# Patient Record
Sex: FEMALE | Race: WHITE | NOT HISPANIC OR LATINO | Employment: OTHER | URBAN - METROPOLITAN AREA
[De-identification: names, ages, dates, MRNs, and addresses within clinical notes are randomized per-mention and may not be internally consistent; named-entity substitution may affect disease eponyms.]

---

## 2018-05-22 ENCOUNTER — TRANSCRIBE ORDERS (OUTPATIENT)
Dept: ADMINISTRATIVE | Facility: HOSPITAL | Age: 48
End: 2018-05-22

## 2018-05-22 DIAGNOSIS — Z12.39 SCREENING BREAST EXAMINATION: Primary | ICD-10-CM

## 2018-05-25 ENCOUNTER — HOSPITAL ENCOUNTER (OUTPATIENT)
Dept: MAMMOGRAPHY | Facility: HOSPITAL | Age: 48
Discharge: HOME | End: 2018-05-25
Attending: INTERNAL MEDICINE | Admitting: INTERNAL MEDICINE

## 2018-05-25 DIAGNOSIS — Z12.39 SCREENING BREAST EXAMINATION: ICD-10-CM

## 2018-05-25 PROCEDURE — 77067 SCR MAMMO BI INCL CAD: CPT

## 2018-06-24 ENCOUNTER — HOSPITAL ENCOUNTER (EMERGENCY)
Facility: HOSPITAL | Age: 48
Discharge: HOME | End: 2018-06-24
Attending: EMERGENCY MEDICINE | Admitting: EMERGENCY MEDICINE

## 2018-06-24 VITALS
RESPIRATION RATE: 16 BRPM | SYSTOLIC BLOOD PRESSURE: 109 MMHG | HEART RATE: 72 BPM | WEIGHT: 145 LBS | OXYGEN SATURATION: 97 % | HEIGHT: 60 IN | TEMPERATURE: 98.2 F | BODY MASS INDEX: 28.47 KG/M2 | DIASTOLIC BLOOD PRESSURE: 82 MMHG

## 2018-06-24 DIAGNOSIS — S91.301A OPEN WOUND OF RIGHT FOOT, INITIAL ENCOUNTER: Primary | ICD-10-CM

## 2018-06-24 PROCEDURE — 90471 IMMUNIZATION ADMIN: CPT | Performed by: EMERGENCY MEDICINE

## 2018-06-24 PROCEDURE — 99283 EMERGENCY DEPT VISIT LOW MDM: CPT

## 2018-06-24 PROCEDURE — 90715 TDAP VACCINE 7 YRS/> IM: CPT | Performed by: EMERGENCY MEDICINE

## 2018-06-24 PROCEDURE — 99282 EMERGENCY DEPT VISIT SF MDM: CPT | Performed by: EMERGENCY MEDICINE

## 2018-06-24 PROCEDURE — 25010000002 TDAP 5-2.5-18.5 LF-MCG/0.5 SUSPENSION: Performed by: EMERGENCY MEDICINE

## 2018-06-24 RX ORDER — CEPHALEXIN 500 MG/1
500 CAPSULE ORAL EVERY 6 HOURS
COMMUNITY
End: 2019-07-30

## 2018-06-24 RX ORDER — LEVOTHYROXINE SODIUM 0.07 MG/1
75 TABLET ORAL DAILY
COMMUNITY

## 2018-06-24 RX ADMIN — TETANUS TOXOID, REDUCED DIPHTHERIA TOXOID AND ACELLULAR PERTUSSIS VACCINE, ADSORBED 0.5 ML: 5; 2.5; 8; 8; 2.5 SUSPENSION INTRAMUSCULAR at 11:28

## 2018-09-12 ENCOUNTER — TRANSCRIBE ORDERS (OUTPATIENT)
Dept: ADMINISTRATIVE | Facility: HOSPITAL | Age: 48
End: 2018-09-12

## 2018-09-12 DIAGNOSIS — R00.2 PALPITATIONS: Primary | ICD-10-CM

## 2018-09-13 ENCOUNTER — HOSPITAL ENCOUNTER (OUTPATIENT)
Dept: CARDIOLOGY | Facility: HOSPITAL | Age: 48
Discharge: HOME | End: 2018-09-13
Attending: INTERNAL MEDICINE | Admitting: INTERNAL MEDICINE

## 2018-09-13 DIAGNOSIS — R00.2 PALPITATIONS: ICD-10-CM

## 2018-09-13 PROCEDURE — 93225 XTRNL ECG REC<48 HRS REC: CPT

## 2018-09-13 PROCEDURE — 93226 XTRNL ECG REC<48 HR SCAN A/R: CPT

## 2018-09-17 PROCEDURE — 93227 XTRNL ECG REC<48 HR R&I: CPT | Performed by: INTERNAL MEDICINE

## 2019-05-17 RX ORDER — METRONIDAZOLE 500 MG/1
500 TABLET ORAL 3 TIMES DAILY
Qty: 21 TABLET | Refills: 0 | Status: SHIPPED | OUTPATIENT
Start: 2019-05-17 | End: 2019-05-24

## 2019-05-20 ENCOUNTER — OFFICE VISIT (OUTPATIENT)
Dept: FAMILY MEDICINE CLINIC | Facility: CLINIC | Age: 49
End: 2019-05-20

## 2019-05-20 VITALS
TEMPERATURE: 98.4 F | OXYGEN SATURATION: 98 % | HEART RATE: 77 BPM | SYSTOLIC BLOOD PRESSURE: 116 MMHG | BODY MASS INDEX: 24.32 KG/M2 | WEIGHT: 146 LBS | DIASTOLIC BLOOD PRESSURE: 64 MMHG | HEIGHT: 65 IN | RESPIRATION RATE: 14 BRPM

## 2019-05-20 DIAGNOSIS — E03.9 HYPOTHYROIDISM, UNSPECIFIED TYPE: ICD-10-CM

## 2019-05-20 DIAGNOSIS — E78.5 HYPERLIPIDEMIA, UNSPECIFIED HYPERLIPIDEMIA TYPE: Primary | ICD-10-CM

## 2019-05-20 PROBLEM — C43.9 MELANOMA (HCC): Status: ACTIVE | Noted: 2019-05-20

## 2019-05-20 LAB
ALBUMIN SERPL-MCNC: 4.6 G/DL (ref 3.5–5.2)
ALBUMIN/GLOB SERPL: 1.8 G/DL
ALP SERPL-CCNC: 70 U/L (ref 39–117)
ALT SERPL-CCNC: 16 U/L (ref 1–33)
AST SERPL-CCNC: 20 U/L (ref 1–32)
BASOPHILS # BLD AUTO: 0.05 10*3/MM3 (ref 0–0.2)
BASOPHILS NFR BLD AUTO: 0.8 % (ref 0–1.5)
BILIRUB SERPL-MCNC: 0.4 MG/DL (ref 0.2–1.2)
BUN SERPL-MCNC: 14 MG/DL (ref 6–20)
BUN/CREAT SERPL: 14 (ref 7–25)
CALCIUM SERPL-MCNC: 10.2 MG/DL (ref 8.6–10.5)
CHLORIDE SERPL-SCNC: 105 MMOL/L (ref 98–107)
CHOLEST SERPL-MCNC: 206 MG/DL (ref 0–200)
CHOLEST/HDLC SERPL: 3.22 {RATIO}
CO2 SERPL-SCNC: 26.4 MMOL/L (ref 22–29)
CREAT SERPL-MCNC: 1 MG/DL (ref 0.57–1)
EOSINOPHIL # BLD AUTO: 0.09 10*3/MM3 (ref 0–0.4)
EOSINOPHIL NFR BLD AUTO: 1.4 % (ref 0.3–6.2)
ERYTHROCYTE [DISTWIDTH] IN BLOOD BY AUTOMATED COUNT: 13.2 % (ref 12.3–15.4)
GLOBULIN SER CALC-MCNC: 2.6 GM/DL
GLUCOSE SERPL-MCNC: 91 MG/DL (ref 65–99)
HCT VFR BLD AUTO: 48.2 % (ref 34–46.6)
HDLC SERPL-MCNC: 64 MG/DL (ref 40–60)
HGB BLD-MCNC: 15.8 G/DL (ref 12–15.9)
IMM GRANULOCYTES # BLD AUTO: 0.01 10*3/MM3 (ref 0–0.05)
IMM GRANULOCYTES NFR BLD AUTO: 0.2 % (ref 0–0.5)
LDLC SERPL CALC-MCNC: 121 MG/DL (ref 0–100)
LYMPHOCYTES # BLD AUTO: 2.14 10*3/MM3 (ref 0.7–3.1)
LYMPHOCYTES NFR BLD AUTO: 34.2 % (ref 19.6–45.3)
MCH RBC QN AUTO: 30.9 PG (ref 26.6–33)
MCHC RBC AUTO-ENTMCNC: 32.8 G/DL (ref 31.5–35.7)
MCV RBC AUTO: 94.1 FL (ref 79–97)
MONOCYTES # BLD AUTO: 0.4 10*3/MM3 (ref 0.1–0.9)
MONOCYTES NFR BLD AUTO: 6.4 % (ref 5–12)
NEUTROPHILS # BLD AUTO: 3.57 10*3/MM3 (ref 1.7–7)
NEUTROPHILS NFR BLD AUTO: 57 % (ref 42.7–76)
NRBC BLD AUTO-RTO: 0 /100 WBC (ref 0–0.2)
PLATELET # BLD AUTO: 203 10*3/MM3 (ref 140–450)
POTASSIUM SERPL-SCNC: 4.9 MMOL/L (ref 3.5–5.2)
PROT SERPL-MCNC: 7.2 G/DL (ref 6–8.5)
RBC # BLD AUTO: 5.12 10*6/MM3 (ref 3.77–5.28)
SODIUM SERPL-SCNC: 143 MMOL/L (ref 136–145)
T4 FREE SERPL-MCNC: 1.35 NG/DL (ref 0.93–1.7)
TRIGL SERPL-MCNC: 103 MG/DL (ref 0–150)
TSH SERPL DL<=0.005 MIU/L-ACNC: 2.34 MIU/ML (ref 0.27–4.2)
VLDLC SERPL CALC-MCNC: 20.6 MG/DL
WBC # BLD AUTO: 6.26 10*3/MM3 (ref 3.4–10.8)

## 2019-05-20 PROCEDURE — 99213 OFFICE O/P EST LOW 20 MIN: CPT | Performed by: INTERNAL MEDICINE

## 2019-05-20 NOTE — PROGRESS NOTES
ALYCE@  Angi HAM Walker is a 49 y.o. female.     No chief complaint on file.  Patient is here for follow-up on hyperlipidemia.    History of Present Illness   Is here for follow-up for hyperlipidemia.  Wants thyroid testing done to she follows with the endocrinologist.  Last week she called for abdominal pain suspicious for diverticulitis flaring up however did not take any medication as she got better with stool softeners.  Patient aware she needs to keep her stool soft.  No fever or chills.  The following portions of the patient's history were reviewed and updated as appropriate: allergies, current medications, past family history, past medical history, past social history, past surgical history and problem list.    Review of Systems   Constitutional: Negative for activity change, appetite change, fatigue and fever.   Eyes: Negative for blurred vision and double vision.   Respiratory: Negative for shortness of breath.    Cardiovascular: Negative for chest pain, palpitations and leg swelling.   Gastrointestinal: Negative.    Neurological: Negative for dizziness, syncope, light-headedness and headache.       Allergies   Allergen Reactions   • Levofloxacin Nausea Only       Current Outpatient Medications on File Prior to Visit   Medication Sig Dispense Refill   • levothyroxine (SYNTHROID, LEVOTHROID) 75 MCG tablet Take 75 mcg by mouth Daily.     • cephalexin (KEFLEX) 500 MG capsule Take 500 mg by mouth Every 6 (Six) Hours.     • metroNIDAZOLE (FLAGYL) 500 MG tablet Take 1 tablet by mouth 3 (Three) Times a Day for 7 days. 21 tablet 0   • mupirocin (BACTROBAN) 2 % ointment Apply  topically 3 (Three) Times a Day.       No current facility-administered medications on file prior to visit.        Family History   Problem Relation Age of Onset   • Breast cancer Maternal Aunt        Past Medical History:   Diagnosis Date   • Diverticulosis    • Hypothyroidism    • Melanoma (CMS/HCC)        Past Surgical History:    Procedure Laterality Date   • HYSTERECTOMY         Social History     Socioeconomic History   • Marital status:      Spouse name: Not on file   • Number of children: Not on file   • Years of education: Not on file   • Highest education level: Not on file   Tobacco Use   • Smoking status: Current Every Day Smoker     Packs/day: 0.50     Types: Cigarettes   • Smokeless tobacco: Never Used   Substance and Sexual Activity   • Alcohol use: Yes     Comment: rare use   • Drug use: No   • Sexual activity: Yes     Partners: Male       Patient Active Problem List   Diagnosis   • Melanoma (CMS/HCC)   • Hypothyroidism   • Hyperlipidemia       Vitals:    05/20/19 0845   BP: 116/64   Pulse: 77   Resp: 14   Temp: 98.4 °F (36.9 °C)   SpO2: 98%       Objective   Physical Exam   Constitutional: She is oriented to person, place, and time. She appears well-developed.   Eyes: EOM are normal. Pupils are equal, round, and reactive to light.   Neck: Normal range of motion. Neck supple. No JVD present. No tracheal deviation present. No thyromegaly present.   Cardiovascular: Normal rate, regular rhythm and intact distal pulses.   No murmur heard.  Pulmonary/Chest: Effort normal and breath sounds normal. No respiratory distress. She has no wheezes.   Abdominal: Soft. Bowel sounds are normal.   Musculoskeletal: She exhibits no edema.   Lymphadenopathy:     She has no cervical adenopathy.   Neurological: She is alert and oriented to person, place, and time.         Assessment/Plan   Diagnoses and all orders for this visit:    Hyperlipidemia, unspecified hyperlipidemia type  -     Ambulatory Referral to Gastroenterology  -     CBC & Differential  -     Comprehensive Metabolic Panel  -     Lipid Panel With / Chol / HDL Ratio  -     TSH  -     T4, Free    Hypothyroidism, unspecified type  -     Ambulatory Referral to Gastroenterology  -     CBC & Differential  -     Comprehensive Metabolic Panel  -     Lipid Panel With / Chol / HDL  Ratio  -     TSH  -     T4, Free    Continue diet and exercise.  Continue to follow with the endocrinologist.  Continue taking Synthroid.  Abdominal pain is resolved.  Patient aware flareup of diverticulitis can cause perforation and abscess.  Return in 6 months.

## 2019-06-12 ENCOUNTER — TELEPHONE (OUTPATIENT)
Dept: GASTROENTEROLOGY | Facility: CLINIC | Age: 49
End: 2019-06-12

## 2019-06-12 DIAGNOSIS — Z12.11 COLON CANCER SCREENING: Primary | ICD-10-CM

## 2019-06-14 PROBLEM — Z12.11 COLON CANCER SCREENING: Status: ACTIVE | Noted: 2019-06-14

## 2019-06-14 NOTE — TELEPHONE ENCOUNTER
emailed instructions    Dr. Constance Rehman   Date: ___7/31/19________     Arrival Time: __8:00am_______    Hospital:  [facility] [place] ([address] [place] [address] [place] [address]) (back of hospital at the emergency room)        Please call the office as soon as possible if you need to reschedule or cancel your procedure please give two weeks’ notice.  If you do not show up or frequently reschedule your procedure, your provider has the option of not rescheduling.    Stop the following medications 5 days prior to your procedure- check with the prescribing doctor prior to holding.  No Aspirin, Advil, Aleve, Motrin, IBU or anything listed on the back of this form.    If you are taking any medications on the attached list and/or pills that contain iron please stop them one week prior. Insulin will be addressed separately.    1.  your prescription AND a 10 oz. bottle of Magnesium Citrate (over the counter) as soon as possible. Do not wait until the day before in case of issues with cost or etc.    The day before your procedure  It is very important that you follow the instructions on this form and not on the prep you were prescribed.    You will be on a clear liquid diet only which may include coffee , tea, soft drinks, broth(chicken beef or vegetable), popsicles, Jell-O, Gatorade, juice (no orange, grapefruit or V-8).  ®No red or purple dyes and no dairy or non-dairy.    2. At 8:00 am drink the bottle of magnesium citrate.  Drink plenty of fluids in between    3. At    2:00 pm drink first dose or ½ of prep, mix as directed on container/box    Drink plenty of fluids between    4. At   10:00 pm      drink second dose or ½ of prep, mix as directed on container/box    5. If you start to get nauseated while drinking your prep try stepping away for 15-20 min. then resume again at a slower pace.    You may have clear liquids only up to 4 hours before your procedure.  No gum or candy!     No smoking or tobacco  products!    You may only take your morning prescription blood pressure (no diuretic combinations), breathing, seizure, psych or heart medications.     You will need a  to accompany you for your exam. Bus or uber not allowed The average time spent in our facility is around 2-3 hours.  You are not to drive, operate machinery or make legal decisions the remainder of the day following you procedure.    Please call [email or web address] [phone] if you have questions about any of this information.  If it is after hours or the weekend and you need to reach the doctor or have questions for the office please call [phone].     It is your responsibility to check with your insurance company to determine benefits and out of pocket costs.      Avoid these medications 5-7 days prior to surgery  Please check with your prescribing doctor before stopping any medications  NSAIDS- Advil, Aleve, Motrin, Ibuprofen, Midol, Excedrin, Fiorinal, Zo-Hiko  (Some cold medications may have these in them)    All herbal medications- iron, vitamin E, fish oil, decongestants (phenylephrine, pseudoephedrine), ginkgo, garlic, ginseng, Carlos’s wart    Mobic (meloxicam), Celebrex, Diclofenac (Voltaren), Nambumetone (Relafen), Daypro, Naproxen, Sulindac, Indomethacin, Toradol, Feldine, Salsalate, Etodolac (Lodine),     Viagra, Cialis, Levitra    Aspirin, Plavix (clopidogrel), Effient, Pletal, Coumadin, Pradaxa, Brilinta, Ticlide, Eliquis, (Xaralto- 3 days)      Diet pills -Adipex (phentermine) -2 weeks prior

## 2019-06-18 ENCOUNTER — PREP FOR SURGERY (OUTPATIENT)
Dept: OTHER | Facility: HOSPITAL | Age: 49
End: 2019-06-18

## 2019-07-30 ENCOUNTER — ANESTHESIA EVENT (OUTPATIENT)
Dept: PERIOP | Facility: HOSPITAL | Age: 49
End: 2019-07-30

## 2019-07-31 ENCOUNTER — HOSPITAL ENCOUNTER (OUTPATIENT)
Facility: HOSPITAL | Age: 49
Setting detail: HOSPITAL OUTPATIENT SURGERY
Discharge: HOME | End: 2019-07-31
Attending: INTERNAL MEDICINE | Admitting: INTERNAL MEDICINE

## 2019-07-31 ENCOUNTER — ANESTHESIA (OUTPATIENT)
Dept: PERIOP | Facility: HOSPITAL | Age: 49
End: 2019-07-31

## 2019-07-31 VITALS
RESPIRATION RATE: 14 BRPM | SYSTOLIC BLOOD PRESSURE: 111 MMHG | OXYGEN SATURATION: 98 % | TEMPERATURE: 98.4 F | WEIGHT: 145 LBS | DIASTOLIC BLOOD PRESSURE: 72 MMHG | BODY MASS INDEX: 24.75 KG/M2 | HEART RATE: 70 BPM | HEIGHT: 64 IN

## 2019-07-31 DIAGNOSIS — Z12.11 COLON CANCER SCREENING: ICD-10-CM

## 2019-07-31 PROCEDURE — 45380 COLONOSCOPY AND BIOPSY: CPT | Performed by: INTERNAL MEDICINE

## 2019-07-31 PROCEDURE — 25010000002 PROPOFOL 10 MG/ML EMULSION: Performed by: NURSE ANESTHETIST, CERTIFIED REGISTERED

## 2019-07-31 PROCEDURE — 88305 TISSUE EXAM BY PATHOLOGIST: CPT | Performed by: INTERNAL MEDICINE

## 2019-07-31 RX ORDER — SODIUM CHLORIDE, SODIUM LACTATE, POTASSIUM CHLORIDE, CALCIUM CHLORIDE 600; 310; 30; 20 MG/100ML; MG/100ML; MG/100ML; MG/100ML
9 INJECTION, SOLUTION INTRAVENOUS CONTINUOUS
Status: DISCONTINUED | OUTPATIENT
Start: 2019-07-31 | End: 2019-07-31 | Stop reason: HOSPADM

## 2019-07-31 RX ORDER — PROPOFOL 10 MG/ML
VIAL (ML) INTRAVENOUS AS NEEDED
Status: DISCONTINUED | OUTPATIENT
Start: 2019-07-31 | End: 2019-07-31 | Stop reason: SURG

## 2019-07-31 RX ORDER — LIDOCAINE HYDROCHLORIDE 10 MG/ML
0.5 INJECTION, SOLUTION EPIDURAL; INFILTRATION; INTRACAUDAL; PERINEURAL ONCE AS NEEDED
Status: COMPLETED | OUTPATIENT
Start: 2019-07-31 | End: 2019-07-31

## 2019-07-31 RX ORDER — SODIUM CHLORIDE 0.9 % (FLUSH) 0.9 %
1-10 SYRINGE (ML) INJECTION AS NEEDED
Status: DISCONTINUED | OUTPATIENT
Start: 2019-07-31 | End: 2019-07-31 | Stop reason: HOSPADM

## 2019-07-31 RX ORDER — LIDOCAINE HYDROCHLORIDE 20 MG/ML
INJECTION, SOLUTION INFILTRATION; PERINEURAL AS NEEDED
Status: DISCONTINUED | OUTPATIENT
Start: 2019-07-31 | End: 2019-07-31 | Stop reason: SURG

## 2019-07-31 RX ORDER — MAGNESIUM HYDROXIDE 1200 MG/15ML
LIQUID ORAL AS NEEDED
Status: DISCONTINUED | OUTPATIENT
Start: 2019-07-31 | End: 2019-07-31 | Stop reason: HOSPADM

## 2019-07-31 RX ORDER — SODIUM CHLORIDE 9 MG/ML
40 INJECTION, SOLUTION INTRAVENOUS AS NEEDED
Status: DISCONTINUED | OUTPATIENT
Start: 2019-07-31 | End: 2019-07-31 | Stop reason: HOSPADM

## 2019-07-31 RX ORDER — PROPOFOL 10 MG/ML
VIAL (ML) INTRAVENOUS CONTINUOUS PRN
Status: DISCONTINUED | OUTPATIENT
Start: 2019-07-31 | End: 2019-07-31 | Stop reason: SURG

## 2019-07-31 RX ADMIN — PROPOFOL 50 MG: 10 INJECTION, EMULSION INTRAVENOUS at 09:22

## 2019-07-31 RX ADMIN — PROPOFOL 50 MG: 10 INJECTION, EMULSION INTRAVENOUS at 09:16

## 2019-07-31 RX ADMIN — LIDOCAINE HYDROCHLORIDE 0.5 ML: 10 INJECTION, SOLUTION EPIDURAL; INFILTRATION; INTRACAUDAL; PERINEURAL at 08:15

## 2019-07-31 RX ADMIN — PROPOFOL 50 MG: 10 INJECTION, EMULSION INTRAVENOUS at 09:27

## 2019-07-31 RX ADMIN — PROPOFOL 50 MG: 10 INJECTION, EMULSION INTRAVENOUS at 09:10

## 2019-07-31 RX ADMIN — PROPOFOL 30 MG: 10 INJECTION, EMULSION INTRAVENOUS at 09:31

## 2019-07-31 RX ADMIN — SODIUM CHLORIDE, POTASSIUM CHLORIDE, SODIUM LACTATE AND CALCIUM CHLORIDE 9 ML/HR: 600; 310; 30; 20 INJECTION, SOLUTION INTRAVENOUS at 08:15

## 2019-07-31 RX ADMIN — SODIUM CHLORIDE, POTASSIUM CHLORIDE, SODIUM LACTATE AND CALCIUM CHLORIDE: 600; 310; 30; 20 INJECTION, SOLUTION INTRAVENOUS at 09:02

## 2019-07-31 RX ADMIN — PROPOFOL 50 MG: 10 INJECTION, EMULSION INTRAVENOUS at 09:08

## 2019-07-31 RX ADMIN — LIDOCAINE HYDROCHLORIDE 50 MG: 20 INJECTION, SOLUTION INFILTRATION; PERINEURAL at 09:07

## 2019-07-31 RX ADMIN — PROPOFOL 100 MCG/KG/MIN: 10 INJECTION, EMULSION INTRAVENOUS at 09:07

## 2019-07-31 NOTE — ANESTHESIA POSTPROCEDURE EVALUATION
Patient: Angi Fuentes    Procedure Summary     Date:  07/31/19 Room / Location:  [facility] ENDOSCOPY 2 /  [facility] OR    Anesthesia Start:  0902 Anesthesia Stop:  0935    Procedure:  COLONOSCOPY WITH BIOPSIES (N/A ) Diagnosis:       Colon cancer screening      (Colon cancer screening [Z12.11])    Surgeon:  Constance Rehman MD Provider:  Gui Aguirre CRNA    Anesthesia Type:  MAC ASA Status:  2          Anesthesia Type: MAC  Last vitals  BP   103/76 (07/31/19 0819)   Temp   98.3 °F (36.8 °C) (07/31/19 0819)   Pulse   72 (07/31/19 0819)   Resp   15 (07/31/19 0819)     SpO2   98 % (07/31/19 0819)     Post Anesthesia Care and Evaluation    Patient location during evaluation: bedside  Patient participation: complete - patient participated  Level of consciousness: awake and alert  Pain score: 0  Pain management: adequate  Airway patency: patent  Anesthetic complications: No anesthetic complications  PONV Status: none  Cardiovascular status: acceptable  Respiratory status: acceptable  Hydration status: acceptable

## 2019-07-31 NOTE — ANESTHESIA PREPROCEDURE EVALUATION
Anesthesia Evaluation     Patient summary reviewed and Nursing notes reviewed   history of anesthetic complications: PONV  NPO Solid Status: > 8 hours  NPO Liquid Status: > 8 hours           Airway   Mallampati: II  TM distance: >3 FB  Neck ROM: full  Possible difficult intubation  Dental - normal exam     Pulmonary - normal exam    breath sounds clear to auscultation  (+) a smoker Current Smoked day of surgery,   Recent URI: allergy related sinus drainage. Sleep apnea: snores.  Cardiovascular - negative cardio ROS and normal exam    Rhythm: regular  Rate: normal      ROS comment: 5/20/14 stress test: IMPRESSION-  1. No significant stress-induced ischemia.  2. Preserved left ventricular function.  Ejection fraction calculated at  61%.    Neuro/Psych- negative ROS  GI/Hepatic/Renal/Endo    (+)   hypothyroidism,     ROS Comment: HX of divertuiculitis    Musculoskeletal     (+) back pain,   Abdominal  - normal exam   Substance History   (+) alcohol use (occ),      OB/GYN          Other   (+) arthritis   history of cancer remission                  Anesthesia Plan    ASA 2     MAC     intravenous induction   Anesthetic plan, all risks, benefits, and alternatives have been provided, discussed and informed consent has been obtained with: patient.  Use of blood products discussed with patient  Consented to blood products.

## 2019-08-01 LAB
CYTO UR: NORMAL
LAB AP CASE REPORT: NORMAL
LAB AP DIAGNOSIS COMMENT: NORMAL
PATH REPORT.FINAL DX SPEC: NORMAL
PATH REPORT.GROSS SPEC: NORMAL

## 2019-08-01 NOTE — PROGRESS NOTES
Mild inflammation at the ileocecal valve, looks like something self-limited, acute and not chronic. Repeat in 5 years

## 2019-10-09 ENCOUNTER — OFFICE VISIT (OUTPATIENT)
Dept: FAMILY MEDICINE CLINIC | Facility: CLINIC | Age: 49
End: 2019-10-09

## 2019-10-09 VITALS
DIASTOLIC BLOOD PRESSURE: 74 MMHG | WEIGHT: 143.8 LBS | HEIGHT: 64 IN | TEMPERATURE: 99.8 F | HEART RATE: 106 BPM | BODY MASS INDEX: 24.55 KG/M2 | OXYGEN SATURATION: 98 % | SYSTOLIC BLOOD PRESSURE: 112 MMHG

## 2019-10-09 DIAGNOSIS — J06.9 ACUTE URI: Primary | ICD-10-CM

## 2019-10-09 PROCEDURE — 99213 OFFICE O/P EST LOW 20 MIN: CPT | Performed by: INTERNAL MEDICINE

## 2019-10-09 RX ORDER — LEVOFLOXACIN 500 MG/1
500 TABLET, FILM COATED ORAL DAILY
Qty: 10 TABLET | Refills: 0 | Status: SHIPPED | OUTPATIENT
Start: 2019-10-09 | End: 2020-06-15

## 2019-10-09 NOTE — PROGRESS NOTES
ALYCE@  Angi Fuentes is a 49 y.o. female.     Chief Complaint   Patient presents with   • Cough     has been going on for about a couple of days. Has had green mucus coming up from chest    • Sore Throat   • Chills       History of Present Illness  Here for cough, congestion, sore throat, fevers.  Coughing up green sputum.  Symptoms for 2 days.  No shortness of breath or chest pain.  The following portions of the patient's history were reviewed and updated as appropriate: allergies, current medications, past family history, past medical history, past social history, past surgical history and problem list.    Review of Systems   Constitutional: Negative for activity change, appetite change, chills and fatigue.        Feverish   HENT: Positive for sinus pressure and sore throat. Negative for congestion, ear discharge, ear pain, facial swelling, postnasal drip, rhinorrhea and sneezing.    Respiratory: Positive for cough. Negative for chest tightness, shortness of breath, wheezing and stridor.         Green sputum production       Allergies   Allergen Reactions   • Levofloxacin Nausea Only       Current Outpatient Medications on File Prior to Visit   Medication Sig Dispense Refill   • levothyroxine (SYNTHROID, LEVOTHROID) 75 MCG tablet Take 75 mcg by mouth Daily.       No current facility-administered medications on file prior to visit.        Family History   Problem Relation Age of Onset   • Breast cancer Maternal Aunt    • Colon polyps Maternal Aunt    • Colon polyps Sister    • Colon cancer Neg Hx        Past Medical History:   Diagnosis Date   • Diverticulosis    • Hypothyroidism    • Melanoma (CMS/HCC) 2011    left lower leg   • PONV (postoperative nausea and vomiting)    • Presence of pessary        Past Surgical History:   Procedure Laterality Date   • COLONOSCOPY N/A 7/31/2019    Procedure: COLONOSCOPY WITH BIOPSIES;  Surgeon: Constance Rehman MD;  Location: [facility];  Service: Gastroenterology    • DILATATION AND CURETTAGE  2000   • HYSTERECTOMY         Social History     Socioeconomic History   • Marital status:      Spouse name: Not on file   • Number of children: Not on file   • Years of education: Not on file   • Highest education level: Not on file   Tobacco Use   • Smoking status: Current Every Day Smoker     Packs/day: 0.50     Years: 15.00     Pack years: 7.50     Types: Cigarettes   • Smokeless tobacco: Never Used   Substance and Sexual Activity   • Alcohol use: Yes     Comment: rare use   • Drug use: No   • Sexual activity: Yes     Partners: Male       Patient Active Problem List   Diagnosis   • Melanoma (CMS/HCC)   • Hypothyroidism   • Hyperlipidemia   • Colon cancer screening       Vitals:    10/09/19 0955   BP: 112/74   Pulse: 106   Temp: 99.8 °F (37.7 °C)   SpO2: 98%       Objective   Physical Exam   Constitutional: She appears well-developed and well-nourished.   HENT:   Head: Normocephalic and atraumatic.   Eyes: EOM are normal. Pupils are equal, round, and reactive to light.   Neck: Normal range of motion. Neck supple. No JVD present. No tracheal deviation present. No thyromegaly present.   Cardiovascular: Normal rate and regular rhythm.   Pulmonary/Chest: Effort normal and breath sounds normal. She has no wheezes.   Lymphadenopathy:     She has no cervical adenopathy.   Nursing note and vitals reviewed.        Assessment/Plan   Angi was seen today for cough, sore throat and chills.    Diagnoses and all orders for this visit:    Acute URI    Other orders  -     levoFLOXacin (LEVAQUIN) 500 MG tablet; Take 1 tablet by mouth Daily.    Patient does not want Z-Kirit.  Wants something stronger.  She is aware she is going to take it for 5 days if you feel better stop do not take total 10 days.  Over-the-counter cough medications, increase fluid check temperature.  Return if no better or as scheduled

## 2019-11-20 ENCOUNTER — OFFICE VISIT (OUTPATIENT)
Dept: FAMILY MEDICINE CLINIC | Facility: CLINIC | Age: 49
End: 2019-11-20

## 2019-11-20 VITALS
HEART RATE: 80 BPM | SYSTOLIC BLOOD PRESSURE: 100 MMHG | OXYGEN SATURATION: 92 % | RESPIRATION RATE: 14 BRPM | BODY MASS INDEX: 24.07 KG/M2 | WEIGHT: 141 LBS | DIASTOLIC BLOOD PRESSURE: 60 MMHG | TEMPERATURE: 97.7 F | HEIGHT: 64 IN

## 2019-11-20 DIAGNOSIS — E78.5 HYPERLIPIDEMIA, UNSPECIFIED HYPERLIPIDEMIA TYPE: Primary | ICD-10-CM

## 2019-11-20 DIAGNOSIS — E03.9 HYPOTHYROIDISM, UNSPECIFIED TYPE: ICD-10-CM

## 2019-11-20 PROCEDURE — 99213 OFFICE O/P EST LOW 20 MIN: CPT | Performed by: INTERNAL MEDICINE

## 2019-11-20 NOTE — PROGRESS NOTES
ALYCE@  Angi Fuentes is a 49 y.o. female.     Chief Complaint   Patient presents with   • Hypothyroidism   • Hyperlipidemia       History of Present Illness   Patient follow-up for hyperlipidemia.  She also here for thyroid but is followed by endocrinologist.  Wants labs done.  Denies any chest pain or shortness of breath.  No cough or wheezing.  Her O2 sat was little low in the office I think that is more because of her hands are very cold, also has nail polish.  I do not believe it is accurate  The following portions of the patient's history were reviewed and updated as appropriate: allergies, current medications, past family history, past medical history, past social history, past surgical history and problem list.    Review of Systems   Constitutional: Negative.    HENT: Negative.    Eyes: Negative.    Respiratory: Negative.    Cardiovascular: Negative.    Gastrointestinal: Negative.    Endocrine: Negative.    Genitourinary: Negative.    Musculoskeletal: Negative.    Skin: Negative.    Allergic/Immunologic: Negative.    Neurological: Negative.    Hematological: Negative.    Psychiatric/Behavioral: Negative.        Allergies   Allergen Reactions   • Levofloxacin Nausea Only       Current Outpatient Medications on File Prior to Visit   Medication Sig Dispense Refill   • levothyroxine (SYNTHROID, LEVOTHROID) 75 MCG tablet Take 75 mcg by mouth Daily.     • levoFLOXacin (LEVAQUIN) 500 MG tablet Take 1 tablet by mouth Daily. 10 tablet 0     No current facility-administered medications on file prior to visit.        Family History   Problem Relation Age of Onset   • Breast cancer Maternal Aunt    • Colon polyps Maternal Aunt    • Colon polyps Sister    • Colon cancer Neg Hx        Past Medical History:   Diagnosis Date   • Diverticulosis    • Hypothyroidism    • Melanoma (CMS/HCC) 2011    left lower leg   • PONV (postoperative nausea and vomiting)    • Presence of pessary        Past Surgical History:  "  Procedure Laterality Date   • COLONOSCOPY N/A 7/31/2019    Procedure: COLONOSCOPY WITH BIOPSIES;  Surgeon: Constance Rehman MD;  Location: [facility];  Service: Gastroenterology   • DILATATION AND CURETTAGE  2000   • HYSTERECTOMY         Social History     Socioeconomic History   • Marital status:      Spouse name: Not on file   • Number of children: Not on file   • Years of education: Not on file   • Highest education level: Not on file   Tobacco Use   • Smoking status: Current Every Day Smoker     Packs/day: 0.50     Years: 15.00     Pack years: 7.50     Types: Cigarettes   • Smokeless tobacco: Never Used   Substance and Sexual Activity   • Alcohol use: Yes     Comment: rare use   • Drug use: No   • Sexual activity: Yes     Partners: Male       Patient Active Problem List   Diagnosis   • Melanoma (CMS/HCC)   • Hypothyroidism   • Hyperlipidemia   • Colon cancer screening       /60 (BP Location: Right arm, Patient Position: Sitting, Cuff Size: Adult)   Pulse 80   Temp 97.7 °F (36.5 °C) (Oral)   Resp 14   Ht 162.6 cm (64\")   Wt 64 kg (141 lb)   LMP  (LMP Unknown) Comment: pt has 1 overie  SpO2 92%   BMI 24.20 kg/m²   Body mass index is 24.2 kg/m².    Objective   Physical Exam   Constitutional: She is oriented to person, place, and time. She appears well-developed and well-nourished.   HENT:   Head: Normocephalic and atraumatic.   Eyes: EOM are normal. Pupils are equal, round, and reactive to light.   Neck: Normal range of motion. Neck supple.   Cardiovascular: Normal rate, regular rhythm, normal heart sounds and intact distal pulses.   No murmur heard.  Pulmonary/Chest: Effort normal and breath sounds normal. No stridor. No respiratory distress. She has no wheezes. She has no rales.   Abdominal: Soft. Bowel sounds are normal. She exhibits no distension. There is no tenderness. There is no guarding.   Neurological: She is alert and oriented to person, place, and time. No cranial nerve deficit. "   Nursing note and vitals reviewed.        Assessment/Plan   Angi was seen today for hypothyroidism and hyperlipidemia.    Diagnoses and all orders for this visit:    Hyperlipidemia, unspecified hyperlipidemia type  -     TSH  -     T3, free  -     Thyroid Stimulating Immunoglobulin  -     T4, free  -     Comprehensive Metabolic Panel  -     Lipid Panel With / Chol / HDL Ratio    Hypothyroidism, unspecified type  -     TSH  -     T3, free  -     Thyroid Stimulating Immunoglobulin  -     T4, free  -     Comprehensive Metabolic Panel  -     Lipid Panel With / Chol / HDL Ratio    Continue diet and exercise.  Continue to follow with the endocrinologist.  Discontinue smoking.  Return 6 months time.  Patient conditions are chronic and stable at this time.  Pulse ox I believe was erroneous, she has no wheezing on exam, does not complain of shortness of breath.  Discussed with patient detail.

## 2019-11-21 LAB
ALBUMIN SERPL-MCNC: 4.8 G/DL (ref 3.5–5.2)
ALBUMIN/GLOB SERPL: 2 G/DL
ALP SERPL-CCNC: 61 U/L (ref 39–117)
ALT SERPL-CCNC: 8 U/L (ref 1–33)
AST SERPL-CCNC: 15 U/L (ref 1–32)
BILIRUB SERPL-MCNC: 0.4 MG/DL (ref 0.2–1.2)
BUN SERPL-MCNC: 22 MG/DL (ref 6–20)
BUN/CREAT SERPL: 21.4 (ref 7–25)
CALCIUM SERPL-MCNC: 9.6 MG/DL (ref 8.6–10.5)
CHLORIDE SERPL-SCNC: 103 MMOL/L (ref 98–107)
CHOLEST SERPL-MCNC: 184 MG/DL (ref 0–200)
CHOLEST/HDLC SERPL: 3.29 {RATIO}
CO2 SERPL-SCNC: 23.3 MMOL/L (ref 22–29)
CREAT SERPL-MCNC: 1.03 MG/DL (ref 0.57–1)
GLOBULIN SER CALC-MCNC: 2.4 GM/DL
GLUCOSE SERPL-MCNC: 88 MG/DL (ref 65–99)
HDLC SERPL-MCNC: 56 MG/DL (ref 40–60)
LDLC SERPL CALC-MCNC: 113 MG/DL (ref 0–100)
POTASSIUM SERPL-SCNC: 4.7 MMOL/L (ref 3.5–5.2)
PROT SERPL-MCNC: 7.2 G/DL (ref 6–8.5)
SODIUM SERPL-SCNC: 140 MMOL/L (ref 136–145)
T3FREE SERPL-MCNC: 2.6 PG/ML (ref 2–4.4)
T4 FREE SERPL-MCNC: 1.51 NG/DL (ref 0.93–1.7)
TRIGL SERPL-MCNC: 75 MG/DL (ref 0–150)
TSH SERPL DL<=0.005 MIU/L-ACNC: 1.15 UIU/ML (ref 0.27–4.2)
TSI SER-ACNC: <0.1 IU/L (ref 0–0.55)
VLDLC SERPL CALC-MCNC: 15 MG/DL

## 2019-12-20 ENCOUNTER — HOSPITAL ENCOUNTER (OUTPATIENT)
Dept: GENERAL RADIOLOGY | Facility: HOSPITAL | Age: 49
Discharge: HOME | End: 2019-12-20
Admitting: FAMILY MEDICINE

## 2019-12-20 ENCOUNTER — OFFICE VISIT (OUTPATIENT)
Dept: FAMILY MEDICINE CLINIC | Facility: CLINIC | Age: 49
End: 2019-12-20

## 2019-12-20 VITALS
OXYGEN SATURATION: 96 % | BODY MASS INDEX: 23.9 KG/M2 | RESPIRATION RATE: 14 BRPM | TEMPERATURE: 97.6 F | SYSTOLIC BLOOD PRESSURE: 120 MMHG | DIASTOLIC BLOOD PRESSURE: 72 MMHG | WEIGHT: 140 LBS | HEIGHT: 64 IN | HEART RATE: 100 BPM

## 2019-12-20 DIAGNOSIS — J02.9 ACUTE PHARYNGITIS, UNSPECIFIED ETIOLOGY: ICD-10-CM

## 2019-12-20 DIAGNOSIS — B96.89 ACUTE BACTERIAL BRONCHITIS: Primary | ICD-10-CM

## 2019-12-20 DIAGNOSIS — R05.9 COUGH: ICD-10-CM

## 2019-12-20 DIAGNOSIS — R06.02 SHORTNESS OF BREATH: ICD-10-CM

## 2019-12-20 DIAGNOSIS — R06.2 WHEEZE: ICD-10-CM

## 2019-12-20 DIAGNOSIS — J20.8 ACUTE BACTERIAL BRONCHITIS: Primary | ICD-10-CM

## 2019-12-20 PROCEDURE — 71046 X-RAY EXAM CHEST 2 VIEWS: CPT

## 2019-12-20 PROCEDURE — 99213 OFFICE O/P EST LOW 20 MIN: CPT | Performed by: FAMILY MEDICINE

## 2019-12-20 RX ORDER — AMOXICILLIN AND CLAVULANATE POTASSIUM 875; 125 MG/1; MG/1
1 TABLET, FILM COATED ORAL 2 TIMES DAILY
Qty: 14 TABLET | Refills: 0 | Status: SHIPPED | OUTPATIENT
Start: 2019-12-20 | End: 2019-12-27

## 2019-12-20 RX ORDER — ALBUTEROL SULFATE 90 UG/1
2 AEROSOL, METERED RESPIRATORY (INHALATION) EVERY 4 HOURS PRN
Qty: 1 INHALER | Refills: 1 | Status: SHIPPED | OUTPATIENT
Start: 2019-12-20 | End: 2021-01-14 | Stop reason: SDUPTHER

## 2019-12-20 NOTE — PROGRESS NOTES
Subjective   Angi Fuentes is a 49 y.o. female is here for   Chief Complaint   Patient presents with   • URI     lt green phlegm with a little bit of blood       History of Present Illness     Ms. Fuentes is not feeling well today.  She states a few days ago she developed a moderate and persistent sore throat and a cough with some green sputum production that is getting worse.  She feels like it started to move down into her chest.  She has had a subjective fever.  She does feel little short of breath when she coughs a lot.  She has been wheezing. She is a smoker. She states she had some leftover Levaquin that she took 2 doses of. She states she gets mild GI side effects to Levaquin if she takes it without food, but otherwise tolerates it well. No nausea, vomiting, or diarrhea.    The following portions of the patient's history were reviewed and updated as appropriate: allergies, current medications, past family history, past medical history, past social history, past surgical history and problem list.     reports that she has been smoking cigarettes. She has a 7.50 pack-year smoking history. She has never used smokeless tobacco. She reports that she drinks alcohol. She reports that she does not use drugs.    Review of Systems   Constitutional: Negative for activity change and unexpected weight change.   HENT: Positive for congestion, postnasal drip, sinus pressure, sinus pain and sore throat.    Respiratory: Positive for cough, shortness of breath and wheezing.    Cardiovascular: Negative for chest pain and palpitations.   Gastrointestinal: Negative for abdominal pain, blood in stool and constipation.   Genitourinary: Negative for difficulty urinating and hematuria.   Musculoskeletal: Negative for gait problem.   Skin: Negative for color change and rash.        PHQ-9 Depression Screening  Little interest or pleasure in doing things?     Feeling down, depressed, or hopeless?     Trouble falling or staying asleep,  "or sleeping too much?     Feeling tired or having little energy?     Poor appetite or overeating?     Feeling bad about yourself - or that you are a failure or have let yourself or your family down?     Trouble concentrating on things, such as reading the newspaper or watching television?     Moving or speaking so slowly that other people could have noticed? Or the opposite - being so fidgety or restless that you have been moving around a lot more than usual?     Thoughts that you would be better off dead, or of hurting yourself in some way?     PHQ-9 Total Score     If you checked off any problems, how difficult have these problems made it for you to do your work, take care of things at home, or get along with other people?           Objective   /72 (BP Location: Left arm, Patient Position: Sitting, Cuff Size: Adult)   Pulse 100   Temp 97.6 °F (36.4 °C) (Oral)   Resp 14   Ht 162.6 cm (64\")   Wt 63.5 kg (140 lb)   LMP  (LMP Unknown) Comment: pt has 1 overie  SpO2 96%   BMI 24.03 kg/m²   Physical Exam   Constitutional: She is oriented to person, place, and time. She appears well-developed and well-nourished. No distress.   HENT:   Head: Normocephalic and atraumatic.   Right Ear: External ear normal.   Left Ear: External ear normal.   Nose: Nose normal.   Mouth/Throat: Posterior oropharyngeal erythema present. No oropharyngeal exudate. Tonsils are 0 on the right. Tonsils are 0 on the left. No tonsillar exudate.   Eyes: Lids are normal. Right eye exhibits no discharge. Left eye exhibits no discharge. No scleral icterus.   Neck: Trachea normal, normal range of motion and full passive range of motion without pain. Neck supple. No tracheal deviation and no edema present. No thyromegaly present.   Cardiovascular: Normal rate, regular rhythm, normal heart sounds and intact distal pulses. Exam reveals no gallop and no friction rub.   No murmur heard.  Pulmonary/Chest: Effort normal. No stridor. No tachypnea and " no bradypnea. No respiratory distress. She has decreased breath sounds in the right lower field and the left lower field. She has wheezes in the right lower field and the left lower field. She has no rhonchi. She has no rales. She exhibits no tenderness.   Abdominal: Normal appearance. There is no hepatosplenomegaly.   Musculoskeletal: She exhibits no edema.   Lymphadenopathy:        Head (right side): No submental, no submandibular, no tonsillar, no preauricular, no posterior auricular and no occipital adenopathy present.        Head (left side): No submental, no submandibular, no tonsillar, no preauricular, no posterior auricular and no occipital adenopathy present.     She has no cervical adenopathy.        Right cervical: No superficial cervical, no deep cervical and no posterior cervical adenopathy present.       Left cervical: No superficial cervical, no deep cervical and no posterior cervical adenopathy present.   Neurological: She is alert and oriented to person, place, and time. She has normal strength and normal reflexes. She is not disoriented.   Skin: Skin is warm, dry and intact. Capillary refill takes less than 2 seconds. No rash noted. She is not diaphoretic. No cyanosis or erythema. No pallor. Nails show no clubbing.   Psychiatric: She has a normal mood and affect. Her behavior is normal. Cognition and memory are normal.   Nursing note and vitals reviewed.      Procedures    Assessment/Plan   Diagnoses and all orders for this visit:    1. Acute bacterial bronchitis (Primary)  -     amoxicillin-clavulanate (AUGMENTIN) 875-125 MG per tablet; Take 1 tablet by mouth 2 (Two) Times a Day for 7 days.  Dispense: 14 tablet; Refill: 0    2. Acute pharyngitis, unspecified etiology  -     amoxicillin-clavulanate (AUGMENTIN) 875-125 MG per tablet; Take 1 tablet by mouth 2 (Two) Times a Day for 7 days.  Dispense: 14 tablet; Refill: 0    3. Wheeze  -     albuterol sulfate HFA (PROVENTIL HFA) 108 (90 Base) MCG/ACT  inhaler; Inhale 2 puffs Every 4 (Four) Hours As Needed for Wheezing.  Dispense: 1 inhaler; Refill: 1

## 2020-01-22 DIAGNOSIS — Z12.31 BREAST CANCER SCREENING BY MAMMOGRAM: Primary | ICD-10-CM

## 2020-02-05 ENCOUNTER — HOSPITAL ENCOUNTER (OUTPATIENT)
Dept: MAMMOGRAPHY | Facility: HOSPITAL | Age: 50
Discharge: HOME | End: 2020-02-05
Admitting: INTERNAL MEDICINE

## 2020-02-05 DIAGNOSIS — Z12.31 BREAST CANCER SCREENING BY MAMMOGRAM: ICD-10-CM

## 2020-02-05 PROCEDURE — 77063 BREAST TOMOSYNTHESIS BI: CPT

## 2020-02-05 PROCEDURE — 77067 SCR MAMMO BI INCL CAD: CPT

## 2020-03-17 RX ORDER — AZITHROMYCIN 250 MG/1
TABLET, FILM COATED ORAL
Qty: 6 TABLET | Refills: 0 | Status: SHIPPED | OUTPATIENT
Start: 2020-03-17 | End: 2020-06-15

## 2020-04-27 ENCOUNTER — TELEPHONE (OUTPATIENT)
Dept: FAMILY MEDICINE CLINIC | Facility: CLINIC | Age: 50
End: 2020-04-27

## 2020-04-27 NOTE — TELEPHONE ENCOUNTER
Patient calling in stating that  told her to call in when eevr she feels her DIVERTICULITIS acting up    Wondering if he could send in some medication for her

## 2020-04-28 RX ORDER — METRONIDAZOLE 500 MG/1
500 TABLET ORAL 3 TIMES DAILY
Qty: 21 TABLET | Refills: 0 | Status: SHIPPED | OUTPATIENT
Start: 2020-04-28 | End: 2020-05-05

## 2020-04-28 NOTE — TELEPHONE ENCOUNTER
PATIENT CALLED AGAIN, STATING SHE TOOK THE LAST PILL SHE HAD FROM LAST TIME. IF PLEASE CAN SEND MORE MEDICATION    PLEASE ADVISE

## 2020-04-28 NOTE — TELEPHONE ENCOUNTER
Patient sent bennett msg and stated she needed something for her Diverticulitis.  Per Dr. Suggs, MetroNidazole 500mg 1 orally TID for 7 days sent to pharmacy.  Patient was also advised per Dr. Suggs if she gets any worse or gets fever to go to ED.  She was also advised to do a liquid diet for a few days.  TAD Maya CMA

## 2020-06-15 ENCOUNTER — OFFICE VISIT (OUTPATIENT)
Dept: FAMILY MEDICINE CLINIC | Facility: CLINIC | Age: 50
End: 2020-06-15

## 2020-06-15 VITALS
SYSTOLIC BLOOD PRESSURE: 118 MMHG | OXYGEN SATURATION: 98 % | DIASTOLIC BLOOD PRESSURE: 72 MMHG | HEART RATE: 94 BPM | WEIGHT: 138 LBS | HEIGHT: 64 IN | BODY MASS INDEX: 23.56 KG/M2 | RESPIRATION RATE: 14 BRPM | TEMPERATURE: 96.8 F

## 2020-06-15 DIAGNOSIS — E78.5 HYPERLIPIDEMIA, UNSPECIFIED HYPERLIPIDEMIA TYPE: ICD-10-CM

## 2020-06-15 DIAGNOSIS — E06.3 CHRONIC LYMPHOCYTIC THYROIDITIS: ICD-10-CM

## 2020-06-15 DIAGNOSIS — E03.8 OTHER SPECIFIED HYPOTHYROIDISM: Primary | ICD-10-CM

## 2020-06-15 PROCEDURE — 99213 OFFICE O/P EST LOW 20 MIN: CPT | Performed by: INTERNAL MEDICINE

## 2020-06-15 NOTE — PROGRESS NOTES
Subjective   Angi Fuentes is a 50 y.o. female.     Chief Complaint   Patient presents with   • Hyperlipidemia   Hypothyroidism    History of Present Illness   He followed for hyperlipidemia and thyroid.  Denies any chest pain shortness of breath.  Dieting and exercising not on any cholesterol medication.  He is taking Synthroid 25 daily.  Follows with the endocrinologist.  Keeping up with her mammograms and colonoscopies.  No weakness, palpitations.  The following portions of the patient's history were reviewed and updated as appropriate: allergies, current medications, past family history, past medical history, past social history, past surgical history and problem list.    Review of Systems   Constitutional: Negative for activity change, appetite change, fatigue and fever.   Eyes: Negative for blurred vision and double vision.   Respiratory: Negative.  Negative for shortness of breath.    Cardiovascular: Negative for chest pain, palpitations and leg swelling.   Gastrointestinal: Negative.    Endocrine: Negative.    Musculoskeletal: Negative for arthralgias.   Neurological: Negative for dizziness, syncope, light-headedness and headache.   Psychiatric/Behavioral: Negative for dysphoric mood.       Allergies   Allergen Reactions   • Levofloxacin Nausea Only     12/20/19: She states she had some leftover Levaquin that she took 2 doses of. She states she gets mild GI side effects to Levaquin if she takes it without food, but otherwise tolerates it well.        Current Outpatient Medications on File Prior to Visit   Medication Sig Dispense Refill   • albuterol sulfate HFA (PROVENTIL HFA) 108 (90 Base) MCG/ACT inhaler Inhale 2 puffs Every 4 (Four) Hours As Needed for Wheezing. 1 inhaler 1   • levothyroxine (SYNTHROID, LEVOTHROID) 75 MCG tablet Take 75 mcg by mouth Daily.     • [DISCONTINUED] azithromycin (Zithromax Z-Kirit) 250 MG tablet Take 2 tablets the first day, then 1 tablet daily for 4 days. 6 tablet 0   •  "[DISCONTINUED] levoFLOXacin (LEVAQUIN) 500 MG tablet Take 1 tablet by mouth Daily. 10 tablet 0     No current facility-administered medications on file prior to visit.        Family History   Problem Relation Age of Onset   • Breast cancer Maternal Aunt    • Colon polyps Maternal Aunt    • Colon polyps Sister    • Colon cancer Neg Hx        Past Medical History:   Diagnosis Date   • Diverticulosis    • Hypothyroidism    • Melanoma (CMS/HCC) 2011    left lower leg   • PONV (postoperative nausea and vomiting)    • Presence of pessary        Past Surgical History:   Procedure Laterality Date   • COLONOSCOPY N/A 7/31/2019    Procedure: COLONOSCOPY WITH BIOPSIES;  Surgeon: Constance Rehman MD;  Location: [facility];  Service: Gastroenterology   • DILATATION AND CURETTAGE  2000   • HYSTERECTOMY         Social History     Socioeconomic History   • Marital status:      Spouse name: Not on file   • Number of children: Not on file   • Years of education: Not on file   • Highest education level: Not on file   Tobacco Use   • Smoking status: Current Every Day Smoker     Packs/day: 0.50     Years: 15.00     Pack years: 7.50     Types: Cigarettes   • Smokeless tobacco: Never Used   Substance and Sexual Activity   • Alcohol use: Yes     Comment: rare use   • Drug use: No   • Sexual activity: Yes     Partners: Male       Patient Active Problem List   Diagnosis   • Melanoma (CMS/HCC)   • Hypothyroidism   • Hyperlipidemia   • Colon cancer screening       /72 (BP Location: Left arm, Patient Position: Sitting, Cuff Size: Adult)   Pulse 94   Temp 96.8 °F (36 °C) (Temporal)   Resp 14   Ht 162.6 cm (64\")   Wt 62.6 kg (138 lb)   LMP  (LMP Unknown) Comment: pt has 1 overie  SpO2 98%   BMI 23.69 kg/m²   Body mass index is 23.69 kg/m².    Objective   Physical Exam   Constitutional: She is oriented to person, place, and time. She appears well-developed.   Eyes: Pupils are equal, round, and reactive to light.   Neck: Normal " range of motion. Neck supple. No JVD present. No tracheal deviation present. No thyromegaly present.   Cardiovascular: Normal rate, regular rhythm and intact distal pulses.   No murmur heard.  Pulmonary/Chest: Effort normal and breath sounds normal. No respiratory distress. She has no wheezes.   Abdominal: Soft. Bowel sounds are normal. She exhibits no distension and no mass. There is no tenderness. There is no rebound and no guarding.   Musculoskeletal: She exhibits no edema.   Lymphadenopathy:     She has no cervical adenopathy.   Neurological: She is alert and oriented to person, place, and time.   Psychiatric: She has a normal mood and affect. Her behavior is normal.   Nursing note and vitals reviewed.        Assessment/Plan   Angi was seen today for hyperlipidemia.    Diagnoses and all orders for this visit:    Other specified hypothyroidism  -     Hepatitis C antibody  -     Comprehensive metabolic panel  -     TSH  -     T3, free  -     T4, free  -     Thyroid Peroxidase Antibody  -     Lipid Panel With / Chol / HDL Ratio    Chronic lymphocytic thyroiditis  -     Hepatitis C antibody  -     Comprehensive metabolic panel  -     TSH  -     T3, free  -     T4, free  -     Thyroid Peroxidase Antibody  -     Lipid Panel With / Chol / HDL Ratio    Hyperlipidemia, unspecified hyperlipidemia type  -     Hepatitis C antibody  -     Lipid Panel With / Chol / HDL Ratio    Continue Synthroid.  Patient has no weakness or palpitation.  Continue diet exercise.  Awaiting labs.  All her problems are chronic and stable.  Keep appointment with endocrinologist.

## 2020-06-16 LAB
ALBUMIN SERPL-MCNC: 4.8 G/DL (ref 3.5–5.2)
ALBUMIN/GLOB SERPL: 1.8 G/DL
ALP SERPL-CCNC: 73 U/L (ref 39–117)
ALT SERPL-CCNC: 10 U/L (ref 1–33)
AST SERPL-CCNC: 14 U/L (ref 1–32)
BILIRUB SERPL-MCNC: 0.4 MG/DL (ref 0.2–1.2)
BUN SERPL-MCNC: 14 MG/DL (ref 6–20)
BUN/CREAT SERPL: 14.3 (ref 7–25)
CALCIUM SERPL-MCNC: 9.8 MG/DL (ref 8.6–10.5)
CHLORIDE SERPL-SCNC: 104 MMOL/L (ref 98–107)
CHOLEST SERPL-MCNC: 198 MG/DL (ref 0–200)
CHOLEST/HDLC SERPL: 3.54 {RATIO}
CO2 SERPL-SCNC: 27.1 MMOL/L (ref 22–29)
CREAT SERPL-MCNC: 0.98 MG/DL (ref 0.57–1)
GLOBULIN SER CALC-MCNC: 2.7 GM/DL
GLUCOSE SERPL-MCNC: 89 MG/DL (ref 65–99)
HCV AB S/CO SERPL IA: <0.1 S/CO RATIO (ref 0–0.9)
HDLC SERPL-MCNC: 56 MG/DL (ref 40–60)
LDLC SERPL CALC-MCNC: 116 MG/DL (ref 0–100)
POTASSIUM SERPL-SCNC: 4.6 MMOL/L (ref 3.5–5.2)
PROT SERPL-MCNC: 7.5 G/DL (ref 6–8.5)
SODIUM SERPL-SCNC: 140 MMOL/L (ref 136–145)
T3FREE SERPL-MCNC: 2.6 PG/ML (ref 2–4.4)
T4 FREE SERPL-MCNC: 1.23 NG/DL (ref 0.93–1.7)
THYROPEROXIDASE AB SERPL-ACNC: 451 IU/ML (ref 0–34)
TRIGL SERPL-MCNC: 130 MG/DL (ref 0–150)
TSH SERPL DL<=0.005 MIU/L-ACNC: 1.97 UIU/ML (ref 0.27–4.2)
VLDLC SERPL CALC-MCNC: 26 MG/DL

## 2020-06-18 ENCOUNTER — TELEPHONE (OUTPATIENT)
Dept: FAMILY MEDICINE CLINIC | Facility: CLINIC | Age: 50
End: 2020-06-18

## 2020-06-18 RX ORDER — METRONIDAZOLE 500 MG/1
500 TABLET ORAL 3 TIMES DAILY
Qty: 15 TABLET | Refills: 0 | Status: SHIPPED | OUTPATIENT
Start: 2020-06-18 | End: 2020-06-23

## 2020-06-18 NOTE — TELEPHONE ENCOUNTER
Pt is aware of the rx that was sent to her pharmacy and agreed to go to the ER to get checked out for her stomach issues.

## 2020-06-18 NOTE — TELEPHONE ENCOUNTER
Pt complaining that she is having a flare up on her stomach issues and request antibiotic sent to [facility]

## 2021-01-14 ENCOUNTER — OFFICE VISIT (OUTPATIENT)
Dept: FAMILY MEDICINE CLINIC | Facility: CLINIC | Age: 51
End: 2021-01-14

## 2021-01-14 VITALS
BODY MASS INDEX: 22.71 KG/M2 | DIASTOLIC BLOOD PRESSURE: 62 MMHG | WEIGHT: 133 LBS | HEIGHT: 64 IN | HEART RATE: 97 BPM | SYSTOLIC BLOOD PRESSURE: 102 MMHG | TEMPERATURE: 97.1 F

## 2021-01-14 DIAGNOSIS — R06.2 WHEEZE: ICD-10-CM

## 2021-01-14 DIAGNOSIS — E78.5 HYPERLIPIDEMIA, UNSPECIFIED HYPERLIPIDEMIA TYPE: Primary | ICD-10-CM

## 2021-01-14 DIAGNOSIS — E03.9 HYPOTHYROIDISM, UNSPECIFIED TYPE: ICD-10-CM

## 2021-01-14 PROCEDURE — 99213 OFFICE O/P EST LOW 20 MIN: CPT | Performed by: INTERNAL MEDICINE

## 2021-01-14 RX ORDER — ALBUTEROL SULFATE 90 UG/1
2 AEROSOL, METERED RESPIRATORY (INHALATION) EVERY 4 HOURS PRN
Qty: 1 G | Refills: 1 | Status: SHIPPED | OUTPATIENT
Start: 2021-01-14 | End: 2022-01-21

## 2021-01-14 NOTE — PROGRESS NOTES
ALYCE@  Angi Fuentes is a 50 y.o. female.     Chief Complaint   Patient presents with   • Hyperlipidemia   • Hypothyroidism       History of Present Illness   Patient here follow-up for hyper lipidemia, hypothyroidism.  Last time she had cholesterol check triglyceride level high but it has improved a lot.  She sees endocrinologist for her thyroid.  She has ordered several blood tests.  The following portions of the patient's history were reviewed and updated as appropriate: allergies, current medications, past family history, past medical history, past social history, past surgical history and problem list.    Review of Systems   Constitutional: Negative for activity change, appetite change, fatigue and fever.   Eyes: Negative for blurred vision and double vision.   Respiratory: Negative.  Negative for shortness of breath.    Cardiovascular: Negative for chest pain, palpitations and leg swelling.   Gastrointestinal: Negative.    Neurological: Negative for dizziness, syncope, light-headedness and headache.       Allergies   Allergen Reactions   • Levofloxacin Nausea Only     12/20/19: She states she had some leftover Levaquin that she took 2 doses of. She states she gets mild GI side effects to Levaquin if she takes it without food, but otherwise tolerates it well.        Current Outpatient Medications on File Prior to Visit   Medication Sig Dispense Refill   • levothyroxine (SYNTHROID, LEVOTHROID) 75 MCG tablet Take 75 mcg by mouth Daily.     • [DISCONTINUED] albuterol sulfate HFA (PROVENTIL HFA) 108 (90 Base) MCG/ACT inhaler Inhale 2 puffs Every 4 (Four) Hours As Needed for Wheezing. 1 inhaler 1     No current facility-administered medications on file prior to visit.        Family History   Problem Relation Age of Onset   • Breast cancer Maternal Aunt    • Colon polyps Maternal Aunt    • Colon polyps Sister    • Hodgkin's lymphoma Mother    • Heart failure Father    • Colon cancer Neg Hx        Past  "Medical History:   Diagnosis Date   • Diverticulosis    • Hypothyroidism    • Melanoma (CMS/HCC) 2011    left lower leg   • PONV (postoperative nausea and vomiting)    • Presence of pessary        Past Surgical History:   Procedure Laterality Date   • COLONOSCOPY N/A 7/31/2019    Procedure: COLONOSCOPY WITH BIOPSIES;  Surgeon: Constance Rehman MD;  Location: [facility];  Service: Gastroenterology   • DILATATION AND CURETTAGE  2000   • HYSTERECTOMY         Social History     Socioeconomic History   • Marital status:      Spouse name: Not on file   • Number of children: Not on file   • Years of education: Not on file   • Highest education level: Not on file   Tobacco Use   • Smoking status: Current Every Day Smoker     Packs/day: 0.50     Years: 15.00     Pack years: 7.50     Types: Cigarettes   • Smokeless tobacco: Never Used   Substance and Sexual Activity   • Alcohol use: Yes     Comment: rare use   • Drug use: No   • Sexual activity: Yes     Partners: Male       Patient Active Problem List   Diagnosis   • Melanoma (CMS/HCC)   • Hypothyroidism   • Hyperlipidemia   • Colon cancer screening       /62 (BP Location: Left arm, Patient Position: Sitting, Cuff Size: Adult)   Pulse 97   Temp 97.1 °F (36.2 °C) (Temporal)   Ht 162.6 cm (64\")   Wt 60.3 kg (133 lb)   LMP  (LMP Unknown) Comment: pt has 1 overie  BMI 22.83 kg/m²   Body mass index is 22.83 kg/m².    Objective   Physical Exam  Constitutional:       Appearance: She is well-developed.   Eyes:      Pupils: Pupils are equal, round, and reactive to light.   Neck:      Musculoskeletal: Normal range of motion and neck supple.      Thyroid: No thyromegaly.      Vascular: No JVD.      Trachea: No tracheal deviation.   Cardiovascular:      Rate and Rhythm: Normal rate and regular rhythm.      Heart sounds: No murmur.   Pulmonary:      Effort: Pulmonary effort is normal. No respiratory distress.      Breath sounds: Normal breath sounds. No wheezing. "   Abdominal:      General: Bowel sounds are normal. There is no distension.      Palpations: Abdomen is soft. There is no mass.      Tenderness: There is no abdominal tenderness. There is no right CVA tenderness, left CVA tenderness, guarding or rebound.      Hernia: No hernia is present.   Lymphadenopathy:      Cervical: No cervical adenopathy.   Neurological:      Mental Status: She is alert and oriented to person, place, and time.           Assessment/Plan   Diagnoses and all orders for this visit:    1. Hyperlipidemia, unspecified hyperlipidemia type (Primary)  -     CBC & Differential  -     Comprehensive Metabolic Panel  -     Lipid Panel With / Chol / HDL Ratio  -     TSH  -     T4, Free  -     Thyroid Peroxidase Antibody; Future  -     T3, Free    2. Wheeze  -     albuterol sulfate HFA (Proventil HFA) 108 (90 Base) MCG/ACT inhaler; Inhale 2 puffs Every 4 (Four) Hours As Needed for Wheezing.  Dispense: 1 g; Refill: 1    3. Hypothyroidism, unspecified type  -     CBC & Differential  -     Comprehensive Metabolic Panel  -     Lipid Panel With / Chol / HDL Ratio  -     TSH  -     T4, Free  -     Thyroid Peroxidase Antibody; Future  -     T3, Free    Continue diet and exercise.  Continue follow-up with the endocrinologist.  Continue Synthroid.  Return in 1 years time.  Keep appointment for mammogram sent Pap smears.

## 2021-01-15 LAB
ALBUMIN SERPL-MCNC: 4.5 G/DL (ref 3.5–5.2)
ALBUMIN/GLOB SERPL: 1.8 G/DL
ALP SERPL-CCNC: 66 U/L (ref 39–117)
ALT SERPL-CCNC: 11 U/L (ref 1–33)
AST SERPL-CCNC: 19 U/L (ref 1–32)
BASOPHILS # BLD AUTO: 0.05 10*3/MM3 (ref 0–0.2)
BASOPHILS NFR BLD AUTO: 0.7 % (ref 0–1.5)
BILIRUB SERPL-MCNC: 0.5 MG/DL (ref 0–1.2)
BUN SERPL-MCNC: 20 MG/DL (ref 6–20)
BUN/CREAT SERPL: 20 (ref 7–25)
CALCIUM SERPL-MCNC: 9.5 MG/DL (ref 8.6–10.5)
CHLORIDE SERPL-SCNC: 104 MMOL/L (ref 98–107)
CHOLEST SERPL-MCNC: 189 MG/DL (ref 0–200)
CHOLEST/HDLC SERPL: 3.1 {RATIO}
CO2 SERPL-SCNC: 26.7 MMOL/L (ref 22–29)
CREAT SERPL-MCNC: 1 MG/DL (ref 0.57–1)
EOSINOPHIL # BLD AUTO: 0.07 10*3/MM3 (ref 0–0.4)
EOSINOPHIL NFR BLD AUTO: 1 % (ref 0.3–6.2)
ERYTHROCYTE [DISTWIDTH] IN BLOOD BY AUTOMATED COUNT: 12.8 % (ref 12.3–15.4)
GLOBULIN SER CALC-MCNC: 2.5 GM/DL
GLUCOSE SERPL-MCNC: 87 MG/DL (ref 65–99)
HCT VFR BLD AUTO: 45.9 % (ref 34–46.6)
HDLC SERPL-MCNC: 61 MG/DL (ref 40–60)
HGB BLD-MCNC: 15.8 G/DL (ref 12–15.9)
IMM GRANULOCYTES # BLD AUTO: 0.02 10*3/MM3 (ref 0–0.05)
IMM GRANULOCYTES NFR BLD AUTO: 0.3 % (ref 0–0.5)
LDLC SERPL CALC-MCNC: 113 MG/DL (ref 0–100)
LYMPHOCYTES # BLD AUTO: 2.53 10*3/MM3 (ref 0.7–3.1)
LYMPHOCYTES NFR BLD AUTO: 35.6 % (ref 19.6–45.3)
MCH RBC QN AUTO: 31.6 PG (ref 26.6–33)
MCHC RBC AUTO-ENTMCNC: 34.4 G/DL (ref 31.5–35.7)
MCV RBC AUTO: 91.8 FL (ref 79–97)
MONOCYTES # BLD AUTO: 0.48 10*3/MM3 (ref 0.1–0.9)
MONOCYTES NFR BLD AUTO: 6.8 % (ref 5–12)
NEUTROPHILS # BLD AUTO: 3.95 10*3/MM3 (ref 1.7–7)
NEUTROPHILS NFR BLD AUTO: 55.6 % (ref 42.7–76)
NRBC BLD AUTO-RTO: 0 /100 WBC (ref 0–0.2)
PLATELET # BLD AUTO: 223 10*3/MM3 (ref 140–450)
POTASSIUM SERPL-SCNC: 4.9 MMOL/L (ref 3.5–5.2)
PROT SERPL-MCNC: 7 G/DL (ref 6–8.5)
RBC # BLD AUTO: 5 10*6/MM3 (ref 3.77–5.28)
SODIUM SERPL-SCNC: 142 MMOL/L (ref 136–145)
T3FREE SERPL-MCNC: 2.6 PG/ML (ref 2–4.4)
T4 FREE SERPL-MCNC: 1.53 NG/DL (ref 0.93–1.7)
TRIGL SERPL-MCNC: 85 MG/DL (ref 0–150)
TSH SERPL DL<=0.005 MIU/L-ACNC: 2.24 UIU/ML (ref 0.27–4.2)
VLDLC SERPL CALC-MCNC: 15 MG/DL (ref 5–40)
WBC # BLD AUTO: 7.1 10*3/MM3 (ref 3.4–10.8)

## 2021-02-03 ENCOUNTER — TELEPHONE (OUTPATIENT)
Dept: FAMILY MEDICINE CLINIC | Facility: CLINIC | Age: 51
End: 2021-02-03

## 2021-02-03 NOTE — TELEPHONE ENCOUNTER
PATIENT IS REQUESTING A CALL BACK TO DISCUSS RECENT LABS, RECEIVED RESULTS IN THE MAIL/MYCHART AND WANTS TO DISCUSS RESULTS.       CALL BACK:[phone]

## 2021-02-23 ENCOUNTER — TRANSCRIBE ORDERS (OUTPATIENT)
Dept: FAMILY MEDICINE CLINIC | Facility: CLINIC | Age: 51
End: 2021-02-23

## 2021-02-23 DIAGNOSIS — Z12.31 SCREENING MAMMOGRAM, ENCOUNTER FOR: Primary | ICD-10-CM

## 2021-03-03 ENCOUNTER — HOSPITAL ENCOUNTER (OUTPATIENT)
Dept: MAMMOGRAPHY | Facility: HOSPITAL | Age: 51
Discharge: HOME | End: 2021-03-03
Admitting: INTERNAL MEDICINE

## 2021-03-03 DIAGNOSIS — Z12.31 SCREENING MAMMOGRAM, ENCOUNTER FOR: ICD-10-CM

## 2021-03-03 PROCEDURE — 77063 BREAST TOMOSYNTHESIS BI: CPT

## 2021-03-03 PROCEDURE — 77067 SCR MAMMO BI INCL CAD: CPT

## 2021-07-14 ENCOUNTER — OFFICE VISIT (OUTPATIENT)
Dept: FAMILY MEDICINE CLINIC | Facility: CLINIC | Age: 51
End: 2021-07-14

## 2021-07-14 VITALS
TEMPERATURE: 97.1 F | HEART RATE: 80 BPM | DIASTOLIC BLOOD PRESSURE: 62 MMHG | SYSTOLIC BLOOD PRESSURE: 118 MMHG | OXYGEN SATURATION: 98 % | WEIGHT: 138 LBS | HEIGHT: 64 IN | BODY MASS INDEX: 23.56 KG/M2

## 2021-07-14 DIAGNOSIS — E78.5 HYPERLIPIDEMIA, UNSPECIFIED HYPERLIPIDEMIA TYPE: ICD-10-CM

## 2021-07-14 DIAGNOSIS — E03.9 HYPOTHYROIDISM, UNSPECIFIED TYPE: Primary | ICD-10-CM

## 2021-07-14 PROCEDURE — 99213 OFFICE O/P EST LOW 20 MIN: CPT | Performed by: INTERNAL MEDICINE

## 2021-07-14 NOTE — PROGRESS NOTES
Mason Fuentes is a 51 y.o. female.     Chief Complaint   Patient presents with   • Hyperlipidemia   • Hypothyroidism   • Wheeze       History of Present Illness   Patient here follow-up for hyperlipidemia, hypothyroidism.  She is follows with endocrinologist.  Wants her labs done.  Taking Synthroid.  No symptoms.  The following portions of the patient's history were reviewed and updated as appropriate: allergies, current medications, past family history, past medical history, past social history, past surgical history and problem list.    Review of Systems   Constitutional: Negative for activity change, appetite change, fatigue and fever.   Eyes: Negative for blurred vision and double vision.   Respiratory: Negative.  Negative for shortness of breath.    Cardiovascular: Negative for chest pain, palpitations and leg swelling.   Gastrointestinal: Negative.    Neurological: Negative for dizziness, syncope, light-headedness and headache.       Allergies   Allergen Reactions   • Levofloxacin Nausea Only     12/20/19: She states she had some leftover Levaquin that she took 2 doses of. She states she gets mild GI side effects to Levaquin if she takes it without food, but otherwise tolerates it well.        Current Outpatient Medications on File Prior to Visit   Medication Sig Dispense Refill   • albuterol sulfate HFA (Proventil HFA) 108 (90 Base) MCG/ACT inhaler Inhale 2 puffs Every 4 (Four) Hours As Needed for Wheezing. 1 g 1   • levothyroxine (SYNTHROID, LEVOTHROID) 75 MCG tablet Take 75 mcg by mouth Daily.       No current facility-administered medications on file prior to visit.       Family History   Problem Relation Age of Onset   • Breast cancer Maternal Aunt    • Colon polyps Maternal Aunt    • Colon polyps Sister    • Hodgkin's lymphoma Mother    • Heart failure Father    • Colon cancer Neg Hx        Past Medical History:   Diagnosis Date   • Diverticulosis    • Hypothyroidism    • Melanoma  "(CMS/HCC) 2011    left lower leg   • PONV (postoperative nausea and vomiting)    • Presence of pessary        Past Surgical History:   Procedure Laterality Date   • COLONOSCOPY N/A 7/31/2019    Procedure: COLONOSCOPY WITH BIOPSIES;  Surgeon: Constance Rehman MD;  Location: [facility];  Service: Gastroenterology   • DILATATION AND CURETTAGE  2000   • HYSTERECTOMY         Social History     Socioeconomic History   • Marital status:      Spouse name: Not on file   • Number of children: Not on file   • Years of education: Not on file   • Highest education level: Not on file   Tobacco Use   • Smoking status: Current Every Day Smoker     Packs/day: 0.50     Years: 15.00     Pack years: 7.50     Types: Cigarettes   • Smokeless tobacco: Never Used   Substance and Sexual Activity   • Alcohol use: Yes     Comment: rare use   • Drug use: No   • Sexual activity: Yes     Partners: Male       Patient Active Problem List   Diagnosis   • Melanoma (CMS/HCC)   • Hypothyroidism   • Hyperlipidemia   • Colon cancer screening       /62 (BP Location: Left arm, Patient Position: Sitting, Cuff Size: Adult)   Pulse 80   Temp 97.1 °F (36.2 °C) (Temporal)   Ht 162.6 cm (64\")   Wt 62.6 kg (138 lb)   LMP  (LMP Unknown) Comment: pt has 1 overie  SpO2 98%   BMI 23.69 kg/m²   Body mass index is 23.69 kg/m².    Objective   Physical Exam  Constitutional:       Appearance: She is well-developed.   Eyes:      Pupils: Pupils are equal, round, and reactive to light.   Neck:      Thyroid: No thyromegaly.      Vascular: No JVD.      Trachea: No tracheal deviation.   Cardiovascular:      Rate and Rhythm: Normal rate and regular rhythm.      Heart sounds: No murmur heard.     Pulmonary:      Effort: Pulmonary effort is normal. No respiratory distress.      Breath sounds: Normal breath sounds. No wheezing.   Abdominal:      General: Bowel sounds are normal. There is no distension.      Palpations: Abdomen is soft.      Tenderness: There is " no abdominal tenderness. There is no right CVA tenderness, left CVA tenderness, guarding or rebound.      Hernia: No hernia is present.   Musculoskeletal:      Cervical back: Normal range of motion and neck supple.   Lymphadenopathy:      Cervical: No cervical adenopathy.   Neurological:      General: No focal deficit present.      Mental Status: She is alert and oriented to person, place, and time.   Psychiatric:         Mood and Affect: Mood normal.         Behavior: Behavior normal.           Assessment/Plan   Diagnoses and all orders for this visit:    1. Hypothyroidism, unspecified type (Primary)  -     Comprehensive Metabolic Panel  -     Lipid Panel With / Chol / HDL Ratio  -     TSH  -     T4, Free  -     Thyroid Peroxidase Antibody  -     T3, Free    2. Hyperlipidemia, unspecified hyperlipidemia type  -     Comprehensive Metabolic Panel  -     Lipid Panel With / Chol / HDL Ratio  -     TSH  -     T4, Free  -     Thyroid Peroxidase Antibody  -     T3, Free    Continue diet and exercise.  Continue Synthroid.  Keep follow-up with endocrinologist.  Discussed with patient regarding COVID-19 vaccination.  She has some hesitancy secondary to her father coincidently dying after the second shot.  Patient could reconsider taking the shots.  Return in 6 months time.

## 2021-07-15 LAB
ALBUMIN SERPL-MCNC: 4.4 G/DL (ref 3.5–5.2)
ALBUMIN/GLOB SERPL: 1.7 G/DL
ALP SERPL-CCNC: 73 U/L (ref 39–117)
ALT SERPL-CCNC: 10 U/L (ref 1–33)
AST SERPL-CCNC: 17 U/L (ref 1–32)
BILIRUB SERPL-MCNC: 0.6 MG/DL (ref 0–1.2)
BUN SERPL-MCNC: 15 MG/DL (ref 6–20)
BUN/CREAT SERPL: 16.1 (ref 7–25)
CALCIUM SERPL-MCNC: 9.8 MG/DL (ref 8.6–10.5)
CHLORIDE SERPL-SCNC: 105 MMOL/L (ref 98–107)
CHOLEST SERPL-MCNC: 197 MG/DL (ref 0–200)
CHOLEST/HDLC SERPL: 3.34 {RATIO}
CO2 SERPL-SCNC: 25.2 MMOL/L (ref 22–29)
CREAT SERPL-MCNC: 0.93 MG/DL (ref 0.57–1)
GLOBULIN SER CALC-MCNC: 2.6 GM/DL
GLUCOSE SERPL-MCNC: 81 MG/DL (ref 65–99)
HDLC SERPL-MCNC: 59 MG/DL (ref 40–60)
LDLC SERPL CALC-MCNC: 124 MG/DL (ref 0–100)
POTASSIUM SERPL-SCNC: 4.9 MMOL/L (ref 3.5–5.2)
PROT SERPL-MCNC: 7 G/DL (ref 6–8.5)
SODIUM SERPL-SCNC: 140 MMOL/L (ref 136–145)
T3FREE SERPL-MCNC: 2.6 PG/ML (ref 2–4.4)
T4 FREE SERPL-MCNC: 1.71 NG/DL (ref 0.93–1.7)
THYROPEROXIDASE AB SERPL-ACNC: 514 IU/ML (ref 0–34)
TRIGL SERPL-MCNC: 75 MG/DL (ref 0–150)
TSH SERPL DL<=0.005 MIU/L-ACNC: 1.75 UIU/ML (ref 0.27–4.2)
VLDLC SERPL CALC-MCNC: 14 MG/DL (ref 5–40)

## 2021-12-03 ENCOUNTER — TELEPHONE (OUTPATIENT)
Dept: FAMILY MEDICINE CLINIC | Facility: CLINIC | Age: 51
End: 2021-12-03

## 2021-12-03 NOTE — TELEPHONE ENCOUNTER
Caller: Angi Fuentes    Relationship: Self    Best call back number: [phone]     What medication are you requesting: ANTIBIOTICS, SOMETHING FOR COUGH.    What are your current symptoms: COUGHING FEELS LIKE BRONCHITIS IS COMING IN HER CHEST, COUGHING UP MUCUS, HARD CHUNKS COMING OUT OF HER NOSE.     How long have you been experiencing symptoms: ALMOST A WEEK.     Have you had these symptoms before:    [x] Yes  [] No    Have you been treated for these symptoms before:   [x] Yes  [] No    If a prescription is needed, what is your preferred pharmacy and phone number: [facility] [address]  [address] [facility] - [phone]  - [phone]      Additional notes:  IS ASKING TO GET SOMETHING SENT INTO FOR HER SO SHE DOES NOT HAVE TO GO THE WEEKEND WITH NO MEDICATION.

## 2021-12-03 NOTE — TELEPHONE ENCOUNTER
Advised pt they'd need to be seen for antibiotic. Advised pt I could get her on the scheduled for 2 weeks from now, when Ifeanyi is back in the office. Advised pt to go to urgent care to be treated for symptoms.

## 2022-02-07 ENCOUNTER — OFFICE VISIT (OUTPATIENT)
Dept: FAMILY MEDICINE CLINIC | Facility: CLINIC | Age: 52
End: 2022-02-07

## 2022-02-07 VITALS
WEIGHT: 139 LBS | HEART RATE: 82 BPM | SYSTOLIC BLOOD PRESSURE: 114 MMHG | BODY MASS INDEX: 23.16 KG/M2 | DIASTOLIC BLOOD PRESSURE: 76 MMHG | OXYGEN SATURATION: 96 % | HEIGHT: 65 IN

## 2022-02-07 DIAGNOSIS — E03.9 HYPOTHYROIDISM, UNSPECIFIED TYPE: ICD-10-CM

## 2022-02-07 DIAGNOSIS — Z12.31 ENCOUNTER FOR SCREENING MAMMOGRAM FOR MALIGNANT NEOPLASM OF BREAST: ICD-10-CM

## 2022-02-07 DIAGNOSIS — E78.5 HYPERLIPIDEMIA, UNSPECIFIED HYPERLIPIDEMIA TYPE: Primary | ICD-10-CM

## 2022-02-07 PROCEDURE — 99213 OFFICE O/P EST LOW 20 MIN: CPT | Performed by: INTERNAL MEDICINE

## 2022-02-07 NOTE — PROGRESS NOTES
Subjective   Angi Fuentes is a 51 y.o. female.     Chief Complaint   Patient presents with   • Hypothyroidism     Hyperlipidemia and Labs   Pt is fasting       History of Present Illness   Patient here follow-up for hyperlipidemia and hypothyroidism.  She recently had COVID-19 is is over it.  She was tested +2 weeks ago.  Her endocrinologist sent at the lab request.  Patient dieting and exercising.  She is on Synthroid.  The following portions of the patient's history were reviewed and updated as appropriate: allergies, current medications, past family history, past medical history, past social history, past surgical history and problem list.    Review of Systems   Constitutional: Negative for activity change, appetite change, fatigue and fever.   Eyes: Negative for blurred vision and double vision.   Respiratory: Negative.  Negative for shortness of breath.    Cardiovascular: Negative for chest pain, palpitations and leg swelling.   Gastrointestinal: Negative.    Genitourinary: Negative for hematuria.   Neurological: Negative for dizziness, syncope, light-headedness and headache.       Allergies   Allergen Reactions   • Levofloxacin Nausea Only     12/20/19: She states she had some leftover Levaquin that she took 2 doses of. She states she gets mild GI side effects to Levaquin if she takes it without food, but otherwise tolerates it well.        Current Outpatient Medications on File Prior to Visit   Medication Sig Dispense Refill   • levothyroxine (SYNTHROID, LEVOTHROID) 75 MCG tablet Take 75 mcg by mouth Daily.       No current facility-administered medications on file prior to visit.       Family History   Problem Relation Age of Onset   • Breast cancer Maternal Aunt    • Colon polyps Maternal Aunt    • Colon polyps Sister    • Hodgkin's lymphoma Mother    • Heart failure Father    • Colon cancer Neg Hx        Past Medical History:   Diagnosis Date   • Diverticulosis    • Hypothyroidism    • Melanoma (HCC)  "2011    left lower leg   • PONV (postoperative nausea and vomiting)    • Presence of pessary        Past Surgical History:   Procedure Laterality Date   • COLONOSCOPY N/A 7/31/2019    Procedure: COLONOSCOPY WITH BIOPSIES;  Surgeon: Constance Rehman MD;  Location: [facility];  Service: Gastroenterology   • DILATATION AND CURETTAGE  2000   • HYSTERECTOMY         Social History     Socioeconomic History   • Marital status:    Tobacco Use   • Smoking status: Current Every Day Smoker     Packs/day: 0.50     Years: 15.00     Pack years: 7.50     Types: Cigarettes   • Smokeless tobacco: Never Used   Vaping Use   • Vaping Use: Never used   Substance and Sexual Activity   • Alcohol use: Yes     Comment: rare use   • Drug use: No   • Sexual activity: Yes     Partners: Male       Patient Active Problem List   Diagnosis   • Melanoma (HCC)   • Hypothyroidism   • Hyperlipidemia   • Colon cancer screening       /76   Pulse 82   Ht 165.1 cm (65\")   Wt 63 kg (139 lb)   LMP  (LMP Unknown) Comment: pt has 1 overie  SpO2 96%   BMI 23.13 kg/m²   Body mass index is 23.13 kg/m².    Objective   Physical Exam  Constitutional:       Appearance: She is well-developed.   Eyes:      Pupils: Pupils are equal, round, and reactive to light.   Neck:      Thyroid: No thyromegaly.      Vascular: No JVD.      Trachea: No tracheal deviation.   Cardiovascular:      Rate and Rhythm: Normal rate and regular rhythm.      Heart sounds: No murmur heard.      Pulmonary:      Effort: Pulmonary effort is normal. No respiratory distress.      Breath sounds: Normal breath sounds. No wheezing.   Abdominal:      General: Bowel sounds are normal. There is no distension.      Palpations: Abdomen is soft.      Tenderness: There is no abdominal tenderness. There is no right CVA tenderness, left CVA tenderness, guarding or rebound.      Hernia: No hernia is present.   Musculoskeletal:      Cervical back: Normal range of motion and neck supple. "   Lymphadenopathy:      Cervical: No cervical adenopathy.   Neurological:      Mental Status: She is alert and oriented to person, place, and time.           Assessment/Plan   Diagnoses and all orders for this visit:    1. Hyperlipidemia, unspecified hyperlipidemia type (Primary)  -     TSH  -     T4, Free  -     T3, Free  -     Thyroid Peroxidase Antibody; Future  -     T4; Future    2. Hypothyroidism, unspecified type  -     TSH  -     T4, Free  -     T3, Free  -     Thyroid Peroxidase Antibody; Future  -     T4; Future    3. Encounter for screening mammogram for malignant neoplasm of breast  -     Mammo Screening Bilateral With CAD; Future    Continue diet and exercise.  Continue to take Synthroid.  Has no problem with it.  Keep follow-up with the endocrinologist.  Return in 6 months time.  All problems are chronic and stable.  EHR dragon/transcription disclaimer:  Part of this note are created by electronic transcription/translation of spoken language to printed text and thus may lead to erroneous, or at times, nonsensical words or phrases inadvertently transcribed.  Although I have reviewed for such errors, some may still exist.         Answers for HPI/ROS submitted by the patient on 1/27/2022  Please describe your symptoms.: Routine check up/bloodwork  Have you had these symptoms before?: No  How long have you been having these symptoms?: Greater than 2 weeks  Please list any medications you are currently taking for this condition.: Levothyroxine 75mcg  Please describe any probable cause for these symptoms. : None  What is the primary reason for your visit?: Other

## 2022-02-08 ENCOUNTER — TELEPHONE (OUTPATIENT)
Dept: FAMILY MEDICINE CLINIC | Facility: CLINIC | Age: 52
End: 2022-02-08

## 2022-02-08 DIAGNOSIS — E78.5 HYPERLIPIDEMIA, UNSPECIFIED HYPERLIPIDEMIA TYPE: Primary | ICD-10-CM

## 2022-02-08 LAB
T3FREE SERPL-MCNC: 2.8 PG/ML (ref 2–4.4)
T4 FREE SERPL-MCNC: 1.61 NG/DL (ref 0.82–1.77)
TSH SERPL DL<=0.005 MIU/L-ACNC: 3.81 UIU/ML (ref 0.45–4.5)

## 2022-02-08 NOTE — TELEPHONE ENCOUNTER
Caller: Angi Fuentes    Relationship: Self    Best call back number: [phone]     What form or medical record are you requesting: LAB WORK RESULTS 2/7/2022  TO DR. CHAUDHARY  .     Who is requesting this form or medical record from you: PATIENT     How would you like to receive the form or medical records (pick-up, mail, fax): FAX - PATIENT DIDN'T HAVE FAX NUMBER AT TIME OF CALL     Timeframe paperwork needed: PATIENT APPOINTMENT IS Thursday  10 TH 2022      Additional notes: N/A

## 2022-02-09 ENCOUNTER — TRANSCRIBE ORDERS (OUTPATIENT)
Dept: FAMILY MEDICINE CLINIC | Facility: CLINIC | Age: 52
End: 2022-02-09

## 2022-02-09 DIAGNOSIS — Z12.31 SCREENING MAMMOGRAM, ENCOUNTER FOR: Primary | ICD-10-CM

## 2022-02-09 LAB
ALBUMIN SERPL-MCNC: 4.4 G/DL (ref 3.8–4.9)
ALBUMIN/GLOB SERPL: 1.8 {RATIO} (ref 1.2–2.2)
ALP SERPL-CCNC: 67 IU/L (ref 44–121)
ALT SERPL-CCNC: 16 IU/L (ref 0–32)
AST SERPL-CCNC: 20 IU/L (ref 0–40)
BILIRUB SERPL-MCNC: <0.2 MG/DL (ref 0–1.2)
BUN SERPL-MCNC: 17 MG/DL (ref 6–24)
BUN/CREAT SERPL: 18 (ref 9–23)
CALCIUM SERPL-MCNC: 9.7 MG/DL (ref 8.7–10.2)
CHLORIDE SERPL-SCNC: 105 MMOL/L (ref 96–106)
CHOLEST SERPL-MCNC: 195 MG/DL (ref 100–199)
CHOLEST/HDLC SERPL: 3.3 RATIO (ref 0–4.4)
CO2 SERPL-SCNC: 22 MMOL/L (ref 20–29)
CREAT SERPL-MCNC: 0.97 MG/DL (ref 0.57–1)
GLOBULIN SER CALC-MCNC: 2.4 G/DL (ref 1.5–4.5)
GLUCOSE SERPL-MCNC: 89 MG/DL (ref 65–99)
HDLC SERPL-MCNC: 59 MG/DL
LDLC SERPL CALC-MCNC: 117 MG/DL (ref 0–99)
POTASSIUM SERPL-SCNC: 4.7 MMOL/L (ref 3.5–5.2)
PROT SERPL-MCNC: 6.8 G/DL (ref 6–8.5)
SODIUM SERPL-SCNC: 143 MMOL/L (ref 134–144)
TRIGL SERPL-MCNC: 109 MG/DL (ref 0–149)
VLDLC SERPL CALC-MCNC: 19 MG/DL (ref 5–40)
WRITTEN AUTHORIZATION: NORMAL

## 2022-03-07 ENCOUNTER — HOSPITAL ENCOUNTER (OUTPATIENT)
Dept: MAMMOGRAPHY | Facility: HOSPITAL | Age: 52
Discharge: HOME | End: 2022-03-07
Admitting: INTERNAL MEDICINE

## 2022-03-07 DIAGNOSIS — Z12.31 SCREENING MAMMOGRAM, ENCOUNTER FOR: ICD-10-CM

## 2022-03-07 PROCEDURE — 77063 BREAST TOMOSYNTHESIS BI: CPT

## 2022-03-07 PROCEDURE — 77067 SCR MAMMO BI INCL CAD: CPT

## 2022-05-20 ENCOUNTER — LAB (OUTPATIENT)
Dept: LAB | Facility: HOSPITAL | Age: 52
End: 2022-05-20

## 2022-05-20 ENCOUNTER — TRANSCRIBE ORDERS (OUTPATIENT)
Dept: ADMINISTRATIVE | Facility: HOSPITAL | Age: 52
End: 2022-05-20

## 2022-05-20 DIAGNOSIS — E06.3 THYROIDITIS, CHRONIC, LYMPHOCYTIC: Primary | ICD-10-CM

## 2022-05-20 DIAGNOSIS — E03.8 ACQUIRED CENTRAL HYPOTHYROIDISM: ICD-10-CM

## 2022-05-20 DIAGNOSIS — E06.3 THYROIDITIS, CHRONIC, LYMPHOCYTIC: ICD-10-CM

## 2022-05-20 LAB
T3FREE SERPL-MCNC: 2.52 PG/ML (ref 2–4.4)
T4 FREE SERPL-MCNC: 1.34 NG/DL (ref 0.93–1.7)
TSH SERPL DL<=0.05 MIU/L-ACNC: 3.82 UIU/ML (ref 0.27–4.2)

## 2022-05-20 PROCEDURE — 36415 COLL VENOUS BLD VENIPUNCTURE: CPT

## 2022-05-20 PROCEDURE — 84439 ASSAY OF FREE THYROXINE: CPT

## 2022-05-20 PROCEDURE — 84481 FREE ASSAY (FT-3): CPT

## 2022-05-20 PROCEDURE — 84442 ASSAY OF THYROID ACTIVITY: CPT

## 2022-05-20 PROCEDURE — 84443 ASSAY THYROID STIM HORMONE: CPT

## 2022-05-20 PROCEDURE — 86376 MICROSOMAL ANTIBODY EACH: CPT

## 2022-05-21 LAB — THYROPEROXIDASE AB SERPL-ACNC: 541 IU/ML (ref 0–34)

## 2022-05-26 LAB — T4BG SERPL-MCNC: NORMAL MG/L

## 2022-07-29 ENCOUNTER — TELEPHONE (OUTPATIENT)
Dept: FAMILY MEDICINE CLINIC | Facility: CLINIC | Age: 52
End: 2022-07-29

## 2022-08-08 ENCOUNTER — OFFICE VISIT (OUTPATIENT)
Dept: FAMILY MEDICINE CLINIC | Facility: CLINIC | Age: 52
End: 2022-08-08

## 2022-08-08 VITALS
WEIGHT: 149 LBS | DIASTOLIC BLOOD PRESSURE: 76 MMHG | HEIGHT: 65 IN | RESPIRATION RATE: 18 BRPM | OXYGEN SATURATION: 95 % | HEART RATE: 81 BPM | TEMPERATURE: 98 F | BODY MASS INDEX: 24.83 KG/M2 | SYSTOLIC BLOOD PRESSURE: 102 MMHG

## 2022-08-08 DIAGNOSIS — E03.9 HYPOTHYROIDISM, UNSPECIFIED TYPE: ICD-10-CM

## 2022-08-08 DIAGNOSIS — E78.5 HYPERLIPIDEMIA, UNSPECIFIED HYPERLIPIDEMIA TYPE: Primary | ICD-10-CM

## 2022-08-08 PROCEDURE — 99213 OFFICE O/P EST LOW 20 MIN: CPT | Performed by: INTERNAL MEDICINE

## 2022-08-08 RX ORDER — METRONIDAZOLE 500 MG/1
500 TABLET ORAL 3 TIMES DAILY
Qty: 30 TABLET | Refills: 0 | Status: SHIPPED | OUTPATIENT
Start: 2022-08-08 | End: 2023-01-23 | Stop reason: SDUPTHER

## 2022-08-08 NOTE — PROGRESS NOTES
Subjective   Angi Fuentes is a 52 y.o. female.     Chief Complaint   Patient presents with   • Hyperlipidemia   Diverticulitis.  Hypothyroidism.    History of Present Illness   Patient followed for hyperlipidemia.  Reports had a flareup of diverticulitis.  He was seen in the urgent care.  Has last day of Flagyl and Cipro.  Reports much better.  Has some diarrhea.  Overall improved.  Wants lab test done for her thyroid ordered by endocrinologist.  Per patient her diverticulitis has been under control has has no flareup for several years.  No fever or chills  The following portions of the patient's history were reviewed and updated as appropriate: allergies, current medications, past family history, past medical history, past social history, past surgical history and problem list.    Review of Systems   Constitutional: Negative for activity change, appetite change, fatigue and fever.   Eyes: Negative for blurred vision and double vision.   Respiratory: Negative.  Negative for shortness of breath.    Cardiovascular: Negative for chest pain, palpitations and leg swelling.   Gastrointestinal: Negative.    Genitourinary: Negative for hematuria.   Neurological: Negative for dizziness, syncope, light-headedness and headache.   Psychiatric/Behavioral: Negative for agitation.       Allergies   Allergen Reactions   • Levofloxacin Nausea Only     12/20/19: She states she had some leftover Levaquin that she took 2 doses of. She states she gets mild GI side effects to Levaquin if she takes it without food, but otherwise tolerates it well.        Current Outpatient Medications on File Prior to Visit   Medication Sig Dispense Refill   • ciprofloxacin (CIPRO) 500 MG tablet Take 1 tablet by mouth Every 12 (Twelve) Hours for 10 days. 20 tablet 0   • levothyroxine (SYNTHROID, LEVOTHROID) 75 MCG tablet Take 75 mcg by mouth Daily.     • metroNIDAZOLE (FLAGYL) 250 MG tablet Take 1 tablet by mouth 3 (Three) Times a Day for 10 days. 30  "tablet 0     No current facility-administered medications on file prior to visit.       Family History   Problem Relation Age of Onset   • Breast cancer Maternal Aunt    • Colon polyps Maternal Aunt    • Colon polyps Sister    • Hodgkin's lymphoma Mother    • Heart failure Father    • Colon cancer Neg Hx        Past Medical History:   Diagnosis Date   • Diverticulosis    • Hypothyroidism    • Melanoma (HCC) 2011    left lower leg   • PONV (postoperative nausea and vomiting)    • Presence of pessary        Past Surgical History:   Procedure Laterality Date   • COLONOSCOPY N/A 07/31/2019    Procedure: COLONOSCOPY WITH BIOPSIES;  Surgeon: Constance Rehman MD;  Location: [facility];  Service: Gastroenterology   • DILATATION AND CURETTAGE  2000   • HYSTERECTOMY         Social History     Socioeconomic History   • Marital status:    Tobacco Use   • Smoking status: Current Every Day Smoker     Packs/day: 0.50     Years: 15.00     Pack years: 7.50     Types: Cigarettes   • Smokeless tobacco: Never Used   Vaping Use   • Vaping Use: Never used   Substance and Sexual Activity   • Alcohol use: Yes     Comment: rare use   • Drug use: No   • Sexual activity: Yes     Partners: Male       Patient Active Problem List   Diagnosis   • Melanoma (HCC)   • Hypothyroidism   • Hyperlipidemia   • Colon cancer screening       /76 (BP Location: Left arm, Patient Position: Sitting, Cuff Size: Large Adult)   Pulse 81   Temp 98 °F (36.7 °C)   Resp 18   Ht 165.1 cm (65\")   Wt 67.6 kg (149 lb)   LMP  (LMP Unknown) Comment: pt has 1 overie  SpO2 95%   BMI 24.79 kg/m²   Body mass index is 24.79 kg/m².    Objective   Physical Exam  Constitutional:       Appearance: She is well-developed.   Eyes:      Pupils: Pupils are equal, round, and reactive to light.   Neck:      Thyroid: No thyromegaly.      Vascular: No JVD.      Trachea: No tracheal deviation.   Cardiovascular:      Rate and Rhythm: Normal rate and regular rhythm.      " Heart sounds: No murmur heard.  Pulmonary:      Effort: Pulmonary effort is normal. No respiratory distress.      Breath sounds: Normal breath sounds. No wheezing.   Abdominal:      General: Bowel sounds are normal. There is no distension.      Palpations: Abdomen is soft. There is no mass.      Tenderness: There is no abdominal tenderness. There is no right CVA tenderness, left CVA tenderness, guarding or rebound.      Hernia: No hernia is present.   Musculoskeletal:      Cervical back: Normal range of motion and neck supple.   Lymphadenopathy:      Cervical: No cervical adenopathy.   Neurological:      Mental Status: She is alert and oriented to person, place, and time.   Psychiatric:         Mood and Affect: Mood normal.         Thought Content: Thought content normal.           Assessment & Plan   Diagnoses and all orders for this visit:    1. Hyperlipidemia, unspecified hyperlipidemia type (Primary)  -     Comprehensive Metabolic Panel  -     CBC & Differential  -     Lipid Panel With / Chol / HDL Ratio  -     TSH  -     T4, Free  -     T3, Free    2. Hypothyroidism, unspecified type  -     Comprehensive Metabolic Panel  -     CBC & Differential  -     Lipid Panel With / Chol / HDL Ratio  -     TSH  -     T4, Free  -     T3, Free    Other orders  -     metroNIDAZOLE (Flagyl) 500 MG tablet; Take 1 tablet by mouth 3 (Three) Times a Day.  Dispense: 30 tablet; Refill: 0    Continue diet and exercise.  Check blood pressure.  Continue Synthroid.  Discussed with patient regarding diverticulitis finish antibiotics.  I given a refill for Flagyl to keep at home.  Patient is aware if extreme pain, fever, nausea vomiting she needs to proceed to ER.  She had colonoscopy not due for repeat for another 2 years.  Keep follow-up with endocrinologist.  All problems are chronic and stable.  Diverticulitis already improving.  EHR dragon/transcription disclaimer:  Part of this note are created by electronic  transcription/translation of spoken language to printed text and thus may lead to erroneous, or at times, nonsensical words or phrases inadvertently transcribed.  Although I have reviewed for such errors, some may still exist.           Answers for HPI/ROS submitted by the patient on 8/1/2022  Please describe your symptoms.: Abdomen discomfort  Have you had these symptoms before?: Yes  How long have you been having these symptoms?: 1-4 days  Please list any medications you are currently taking for this condition.: Ondansectron, Ciprofloxacin, Metronidazole  Please describe any probable cause for these symptoms. : Diverticulitis  What is the primary reason for your visit?: Other

## 2022-08-09 LAB
ALBUMIN SERPL-MCNC: 4.4 G/DL (ref 3.8–4.9)
ALBUMIN/GLOB SERPL: 1.8 {RATIO} (ref 1.2–2.2)
ALP SERPL-CCNC: 75 IU/L (ref 44–121)
ALT SERPL-CCNC: 24 IU/L (ref 0–32)
AST SERPL-CCNC: 33 IU/L (ref 0–40)
BASOPHILS # BLD AUTO: 0.1 X10E3/UL (ref 0–0.2)
BASOPHILS NFR BLD AUTO: 1 %
BILIRUB SERPL-MCNC: 0.2 MG/DL (ref 0–1.2)
BUN SERPL-MCNC: 15 MG/DL (ref 6–24)
BUN/CREAT SERPL: 15 (ref 9–23)
CALCIUM SERPL-MCNC: 9.8 MG/DL (ref 8.7–10.2)
CHLORIDE SERPL-SCNC: 106 MMOL/L (ref 96–106)
CHOLEST SERPL-MCNC: 165 MG/DL (ref 100–199)
CHOLEST/HDLC SERPL: 2.7 RATIO (ref 0–4.4)
CO2 SERPL-SCNC: 24 MMOL/L (ref 20–29)
CREAT SERPL-MCNC: 0.98 MG/DL (ref 0.57–1)
EGFRCR SERPLBLD CKD-EPI 2021: 69 ML/MIN/1.73
EOSINOPHIL # BLD AUTO: 0.1 X10E3/UL (ref 0–0.4)
EOSINOPHIL NFR BLD AUTO: 2 %
ERYTHROCYTE [DISTWIDTH] IN BLOOD BY AUTOMATED COUNT: 12.8 % (ref 11.7–15.4)
GLOBULIN SER CALC-MCNC: 2.5 G/DL (ref 1.5–4.5)
GLUCOSE SERPL-MCNC: 94 MG/DL (ref 65–99)
HCT VFR BLD AUTO: 47.9 % (ref 34–46.6)
HDLC SERPL-MCNC: 62 MG/DL
HGB BLD-MCNC: 16 G/DL (ref 11.1–15.9)
IMM GRANULOCYTES # BLD AUTO: 0 X10E3/UL (ref 0–0.1)
IMM GRANULOCYTES NFR BLD AUTO: 0 %
LDLC SERPL CALC-MCNC: 89 MG/DL (ref 0–99)
LYMPHOCYTES # BLD AUTO: 2.1 X10E3/UL (ref 0.7–3.1)
LYMPHOCYTES NFR BLD AUTO: 29 %
MCH RBC QN AUTO: 31.3 PG (ref 26.6–33)
MCHC RBC AUTO-ENTMCNC: 33.4 G/DL (ref 31.5–35.7)
MCV RBC AUTO: 94 FL (ref 79–97)
MONOCYTES # BLD AUTO: 0.5 X10E3/UL (ref 0.1–0.9)
MONOCYTES NFR BLD AUTO: 7 %
NEUTROPHILS # BLD AUTO: 4.5 X10E3/UL (ref 1.4–7)
NEUTROPHILS NFR BLD AUTO: 61 %
PLATELET # BLD AUTO: 233 X10E3/UL (ref 150–450)
POTASSIUM SERPL-SCNC: 4.9 MMOL/L (ref 3.5–5.2)
PROT SERPL-MCNC: 6.9 G/DL (ref 6–8.5)
RBC # BLD AUTO: 5.11 X10E6/UL (ref 3.77–5.28)
SODIUM SERPL-SCNC: 142 MMOL/L (ref 134–144)
T3FREE SERPL-MCNC: 2.6 PG/ML (ref 2–4.4)
T4 FREE SERPL-MCNC: 1.5 NG/DL (ref 0.82–1.77)
TRIGL SERPL-MCNC: 75 MG/DL (ref 0–149)
TSH SERPL DL<=0.005 MIU/L-ACNC: 5.04 UIU/ML (ref 0.45–4.5)
VLDLC SERPL CALC-MCNC: 14 MG/DL (ref 5–40)
WBC # BLD AUTO: 7.3 X10E3/UL (ref 3.4–10.8)

## 2022-10-21 ENCOUNTER — TRANSCRIBE ORDERS (OUTPATIENT)
Dept: ADMINISTRATIVE | Facility: HOSPITAL | Age: 52
End: 2022-10-21

## 2022-10-21 ENCOUNTER — LAB (OUTPATIENT)
Dept: LAB | Facility: HOSPITAL | Age: 52
End: 2022-10-21

## 2022-10-21 DIAGNOSIS — E06.3 CHRONIC LYMPHOCYTIC THYROIDITIS: Primary | ICD-10-CM

## 2022-10-21 DIAGNOSIS — E03.8 TOXIC DIFFUSE GOITER WITH PRETIBIAL MYXEDEMA: ICD-10-CM

## 2022-10-21 DIAGNOSIS — E05.00 TOXIC DIFFUSE GOITER WITH PRETIBIAL MYXEDEMA: ICD-10-CM

## 2022-10-21 DIAGNOSIS — E06.3 CHRONIC LYMPHOCYTIC THYROIDITIS: ICD-10-CM

## 2022-10-21 LAB
T3FREE SERPL-MCNC: 3 PG/ML (ref 2–4.4)
T4 FREE SERPL-MCNC: 1.76 NG/DL (ref 0.93–1.7)
TSH SERPL DL<=0.05 MIU/L-ACNC: 0.26 UIU/ML (ref 0.27–4.2)

## 2022-10-21 PROCEDURE — 84481 FREE ASSAY (FT-3): CPT

## 2022-10-21 PROCEDURE — 84443 ASSAY THYROID STIM HORMONE: CPT

## 2022-10-21 PROCEDURE — 86376 MICROSOMAL ANTIBODY EACH: CPT

## 2022-10-21 PROCEDURE — 36415 COLL VENOUS BLD VENIPUNCTURE: CPT

## 2022-10-21 PROCEDURE — 84439 ASSAY OF FREE THYROXINE: CPT

## 2022-10-22 LAB — THYROPEROXIDASE AB SERPL-ACNC: 460 IU/ML (ref 0–34)

## 2022-12-30 ENCOUNTER — LAB (OUTPATIENT)
Dept: LAB | Facility: HOSPITAL | Age: 52
End: 2022-12-30
Payer: COMMERCIAL

## 2022-12-30 ENCOUNTER — TRANSCRIBE ORDERS (OUTPATIENT)
Dept: ADMINISTRATIVE | Facility: HOSPITAL | Age: 52
End: 2022-12-30

## 2022-12-30 DIAGNOSIS — E21.3 HYPERPARATHYROIDISM, UNSPECIFIED: Primary | ICD-10-CM

## 2022-12-30 DIAGNOSIS — E21.3 HYPERPARATHYROIDISM, UNSPECIFIED: ICD-10-CM

## 2022-12-30 LAB
T3FREE SERPL-MCNC: 2.95 PG/ML (ref 2–4.4)
T4 FREE SERPL-MCNC: 1.68 NG/DL (ref 0.93–1.7)
T4 SERPL-MCNC: 9.98 MCG/DL (ref 4.5–11.7)
TSH SERPL DL<=0.05 MIU/L-ACNC: 0.92 UIU/ML (ref 0.27–4.2)

## 2022-12-30 PROCEDURE — 84481 FREE ASSAY (FT-3): CPT

## 2022-12-30 PROCEDURE — 84443 ASSAY THYROID STIM HORMONE: CPT

## 2022-12-30 PROCEDURE — 84439 ASSAY OF FREE THYROXINE: CPT

## 2022-12-30 PROCEDURE — 86376 MICROSOMAL ANTIBODY EACH: CPT

## 2022-12-30 PROCEDURE — 36415 COLL VENOUS BLD VENIPUNCTURE: CPT

## 2022-12-31 LAB — THYROPEROXIDASE AB SERPL-ACNC: 451 IU/ML (ref 0–34)

## 2023-01-23 ENCOUNTER — TELEPHONE (OUTPATIENT)
Dept: FAMILY MEDICINE CLINIC | Facility: CLINIC | Age: 53
End: 2023-01-23

## 2023-01-23 RX ORDER — METRONIDAZOLE 500 MG/1
500 TABLET ORAL 3 TIMES DAILY
Qty: 30 TABLET | Refills: 0 | Status: SHIPPED | OUTPATIENT
Start: 2023-01-23 | End: 2023-02-08

## 2023-01-23 NOTE — TELEPHONE ENCOUNTER
Spoke with pt. Refill has been sent to pharmacy, advised if symptoms continue or worsen or continue within the next 2-3 days to visit the ED

## 2023-01-23 NOTE — TELEPHONE ENCOUNTER
Caller: Angi Fuentes     Relationship: [facility]    Best call back number: [phone]    What is your medical concern? PATIENT IS CONCERNED THAT SHE MAY BE HAVING A DIVERTICULITIS FLARE UP. PATIENT STATES THAT SHE IS HAVING DISCOMFORT IN THE CENTER OF HER LOWER ABDOMEN. PATIENT DID SEND A [employer] MESSAGE REQUESTING REFILL ON THE FLAGYL. PATIENT IS REQUESTING TO KNOW IF DR. PHAM WOULD WANT HER TO HAVE A CT SCAN TO CONFIRM.     How long has this issue been going on? 2-3 DAYS     Is your provider already aware of this issue? NOT ABOUT THE FLARE UP     Have you been treated for this issue? YES

## 2023-02-08 ENCOUNTER — OFFICE VISIT (OUTPATIENT)
Dept: FAMILY MEDICINE CLINIC | Facility: CLINIC | Age: 53
End: 2023-02-08
Payer: COMMERCIAL

## 2023-02-08 VITALS
RESPIRATION RATE: 20 BRPM | SYSTOLIC BLOOD PRESSURE: 112 MMHG | HEIGHT: 65 IN | BODY MASS INDEX: 24.66 KG/M2 | TEMPERATURE: 97.3 F | OXYGEN SATURATION: 97 % | HEART RATE: 81 BPM | DIASTOLIC BLOOD PRESSURE: 72 MMHG | WEIGHT: 148 LBS

## 2023-02-08 DIAGNOSIS — K57.90 DIVERTICULOSIS: ICD-10-CM

## 2023-02-08 DIAGNOSIS — E78.2 MIXED HYPERLIPIDEMIA: Primary | ICD-10-CM

## 2023-02-08 DIAGNOSIS — E03.9 HYPOTHYROIDISM, UNSPECIFIED TYPE: ICD-10-CM

## 2023-02-08 LAB
ALBUMIN SERPL-MCNC: 4.5 G/DL (ref 3.5–5.2)
ALBUMIN/GLOB SERPL: 1.8 G/DL
ALP SERPL-CCNC: 65 U/L (ref 39–117)
ALT SERPL-CCNC: 15 U/L (ref 1–33)
AST SERPL-CCNC: 20 U/L (ref 1–32)
BILIRUB SERPL-MCNC: 0.4 MG/DL (ref 0–1.2)
BUN SERPL-MCNC: 15 MG/DL (ref 6–20)
BUN/CREAT SERPL: 18.5 (ref 7–25)
CALCIUM SERPL-MCNC: 9.7 MG/DL (ref 8.6–10.5)
CHLORIDE SERPL-SCNC: 106 MMOL/L (ref 98–107)
CHOLEST SERPL-MCNC: 210 MG/DL (ref 0–200)
CHOLEST/HDLC SERPL: 3.23 {RATIO}
CO2 SERPL-SCNC: 27.9 MMOL/L (ref 22–29)
CREAT SERPL-MCNC: 0.81 MG/DL (ref 0.57–1)
EGFRCR SERPLBLD CKD-EPI 2021: 86.9 ML/MIN/1.73
GLOBULIN SER CALC-MCNC: 2.5 GM/DL
GLUCOSE SERPL-MCNC: 91 MG/DL (ref 65–99)
HDLC SERPL-MCNC: 65 MG/DL (ref 40–60)
LDLC SERPL CALC-MCNC: 131 MG/DL (ref 0–100)
POTASSIUM SERPL-SCNC: 4.9 MMOL/L (ref 3.5–5.2)
PROT SERPL-MCNC: 7 G/DL (ref 6–8.5)
SODIUM SERPL-SCNC: 141 MMOL/L (ref 136–145)
TRIGL SERPL-MCNC: 81 MG/DL (ref 0–150)
VLDLC SERPL CALC-MCNC: 14 MG/DL (ref 5–40)

## 2023-02-08 PROCEDURE — 99213 OFFICE O/P EST LOW 20 MIN: CPT | Performed by: INTERNAL MEDICINE

## 2023-02-08 RX ORDER — METRONIDAZOLE 500 MG/1
500 TABLET ORAL 3 TIMES DAILY
Qty: 30 TABLET | Refills: 0 | Status: SHIPPED | OUTPATIENT
Start: 2023-02-08

## 2023-02-08 NOTE — PROGRESS NOTES
Subjective   Angi Fuentes is a 52 y.o. female.     Chief Complaint   Patient presents with   • Hyperlipidemia     6 month follow up         History of Present Illness   Seen in follow-up for hyperlipidemia.  Follows with endocrinologist for the thyroid.  Has labs scheduled next month.  Reports had flareup of her diverticulitis in December took her Flagyl.  Wants Flagyl for future use.  Patient is aware if severe abdominal pain needs CAT scan done she needs to go to the ER at that time.  She is aware that the risk of perforation if recurrent.  Flagyl is for emergencies, once she starts she needs to come in follow-up.  Clear sinus drainage, cough with clear sputum.  Symptoms for last few days.  The following portions of the patient's history were reviewed and updated as appropriate: allergies, current medications, past family history, past medical history, past social history, past surgical history and problem list.    Review of Systems   Constitutional: Negative for activity change, appetite change, fatigue and fever.   HENT: Positive for rhinorrhea.    Eyes: Negative for blurred vision and double vision.   Respiratory: Negative.  Negative for shortness of breath.    Cardiovascular: Negative for chest pain, palpitations and leg swelling.   Gastrointestinal: Negative.    Neurological: Negative for dizziness, syncope, light-headedness and headache.       Allergies   Allergen Reactions   • Levofloxacin Nausea Only     12/20/19: She states she had some leftover Levaquin that she took 2 doses of. She states she gets mild GI side effects to Levaquin if she takes it without food, but otherwise tolerates it well.        Current Outpatient Medications on File Prior to Visit   Medication Sig Dispense Refill   • levothyroxine (SYNTHROID, LEVOTHROID) 75 MCG tablet Take 75 mcg by mouth Daily. Patient says that she is taking 100mcg M-Saturday and Sunday she takes a half of a pill     • [DISCONTINUED] metroNIDAZOLE (Flagyl)  "500 MG tablet Take 1 tablet by mouth 3 (Three) Times a Day. 30 tablet 0     No current facility-administered medications on file prior to visit.       Family History   Problem Relation Age of Onset   • Breast cancer Maternal Aunt    • Colon polyps Maternal Aunt    • Colon polyps Sister    • Hodgkin's lymphoma Mother    • Heart failure Father    • Colon cancer Neg Hx        Past Medical History:   Diagnosis Date   • Diverticulosis    • Hypothyroidism    • Melanoma (HCC) 2011    left lower leg   • PONV (postoperative nausea and vomiting)    • Presence of pessary        Past Surgical History:   Procedure Laterality Date   • COLONOSCOPY N/A 07/31/2019    Procedure: COLONOSCOPY WITH BIOPSIES;  Surgeon: Constance Rehman MD;  Location: [facility];  Service: Gastroenterology   • DILATATION AND CURETTAGE  2000   • HYSTERECTOMY         Social History     Socioeconomic History   • Marital status:    Tobacco Use   • Smoking status: Every Day     Packs/day: 0.50     Years: 15.00     Pack years: 7.50     Types: Cigarettes   • Smokeless tobacco: Never   Vaping Use   • Vaping Use: Never used   Substance and Sexual Activity   • Alcohol use: Yes     Comment: rare use   • Drug use: No   • Sexual activity: Yes     Partners: Male       Patient Active Problem List   Diagnosis   • Melanoma (HCC)   • Hypothyroidism   • Hyperlipidemia   • Colon cancer screening   • Diverticulosis       /72 (BP Location: Left arm, Patient Position: Sitting, Cuff Size: Adult)   Pulse 81   Temp 97.3 °F (36.3 °C) (Infrared)   Resp 20   Ht 165.1 cm (65\")   Wt 67.1 kg (148 lb)   LMP  (LMP Unknown) Comment: pt has 1 overie  SpO2 97%   BMI 24.63 kg/m²   Body mass index is 24.63 kg/m².    Objective   Physical Exam  Vitals and nursing note reviewed.   Constitutional:       Appearance: She is well-developed.   Eyes:      Pupils: Pupils are equal, round, and reactive to light.   Neck:      Thyroid: No thyromegaly.      Vascular: No JVD.      Trachea: " No tracheal deviation.   Cardiovascular:      Rate and Rhythm: Normal rate and regular rhythm.      Heart sounds: No murmur heard.  Pulmonary:      Effort: Pulmonary effort is normal. No respiratory distress.      Breath sounds: Normal breath sounds. No wheezing.   Abdominal:      General: Bowel sounds are normal. There is no distension.      Palpations: Abdomen is soft. There is no mass.      Tenderness: There is no abdominal tenderness. There is no right CVA tenderness, left CVA tenderness, guarding or rebound.      Hernia: No hernia is present.   Musculoskeletal:      Cervical back: Normal range of motion and neck supple.   Lymphadenopathy:      Cervical: No cervical adenopathy.   Neurological:      Mental Status: She is alert and oriented to person, place, and time.   Psychiatric:         Mood and Affect: Mood normal.         Behavior: Behavior normal.           Assessment & Plan   Diagnoses and all orders for this visit:    1. Mixed hyperlipidemia (Primary)  -     Comprehensive Metabolic Panel  -     Lipid Panel With / Chol / HDL Ratio    2. Hypothyroidism, unspecified type  -     Comprehensive Metabolic Panel  -     Lipid Panel With / Chol / HDL Ratio    3. Diverticulosis    Other orders  -     metroNIDAZOLE (Flagyl) 500 MG tablet; Take 1 tablet by mouth 3 (Three) Times a Day.  Dispense: 30 tablet; Refill: 0    Continue diet and exercise.  Continue to follow-up with endocrinologist.  Continue Synthroid.  Have given Flagyl she is aware it is for emergencies but she needs follow-up if she uses again.  She is aware of risk of perforation if worse or an abscess.  Recommend patient get CT scan of the heart.  Rest of problems are chronic and stable.  Regarding her allergy symptoms she will take over-the-counter medication.  EHR dragon/transcription disclaimer:  Part of this note are created by electronic transcription/translation of spoken language to printed text and thus may lead to erroneous, or at times,  nonsensical words or phrases inadvertently transcribed.  Although I have reviewed for such errors, some may still exist.           Answers for HPI/ROS submitted by the patient on 2/1/2023  Please describe your symptoms.: Check up  Have you had these symptoms before?: No  How long have you been having these symptoms?: Greater than 2 weeks  Please list any medications you are currently taking for this condition.: Levothyroxin 94 mcg  Please describe any probable cause for these symptoms. : None  What is the primary reason for your visit?: Other

## 2023-02-24 ENCOUNTER — TRANSCRIBE ORDERS (OUTPATIENT)
Dept: ADMINISTRATIVE | Facility: HOSPITAL | Age: 53
End: 2023-02-24
Payer: COMMERCIAL

## 2023-02-24 DIAGNOSIS — Z12.31 ENCOUNTER FOR SCREENING MAMMOGRAM FOR MALIGNANT NEOPLASM OF BREAST: Primary | ICD-10-CM

## 2023-03-09 ENCOUNTER — HOSPITAL ENCOUNTER (OUTPATIENT)
Dept: MAMMOGRAPHY | Facility: HOSPITAL | Age: 53
Discharge: HOME | End: 2023-03-09
Admitting: INTERNAL MEDICINE
Payer: COMMERCIAL

## 2023-03-09 DIAGNOSIS — Z12.31 ENCOUNTER FOR SCREENING MAMMOGRAM FOR MALIGNANT NEOPLASM OF BREAST: ICD-10-CM

## 2023-03-09 PROCEDURE — 77067 SCR MAMMO BI INCL CAD: CPT

## 2023-03-09 PROCEDURE — 77063 BREAST TOMOSYNTHESIS BI: CPT

## 2023-04-04 ENCOUNTER — TRANSCRIBE ORDERS (OUTPATIENT)
Dept: ADMINISTRATIVE | Facility: HOSPITAL | Age: 53
End: 2023-04-04
Payer: COMMERCIAL

## 2023-04-04 ENCOUNTER — LAB (OUTPATIENT)
Dept: LAB | Facility: HOSPITAL | Age: 53
End: 2023-04-04
Payer: COMMERCIAL

## 2023-04-04 DIAGNOSIS — E05.00 TOXIC DIFFUSE GOITER WITH PRETIBIAL MYXEDEMA: Primary | ICD-10-CM

## 2023-04-04 DIAGNOSIS — E03.8 TOXIC DIFFUSE GOITER WITH PRETIBIAL MYXEDEMA: Primary | ICD-10-CM

## 2023-04-04 DIAGNOSIS — E05.00 TOXIC DIFFUSE GOITER WITH PRETIBIAL MYXEDEMA: ICD-10-CM

## 2023-04-04 DIAGNOSIS — E03.8 TOXIC DIFFUSE GOITER WITH PRETIBIAL MYXEDEMA: ICD-10-CM

## 2023-04-04 LAB
T3FREE SERPL-MCNC: 2.73 PG/ML (ref 2–4.4)
T4 FREE SERPL-MCNC: 1.66 NG/DL (ref 0.93–1.7)
T4 SERPL-MCNC: 9.17 MCG/DL (ref 4.5–11.7)
TSH SERPL DL<=0.05 MIU/L-ACNC: 0.54 UIU/ML (ref 0.27–4.2)

## 2023-04-04 PROCEDURE — 84481 FREE ASSAY (FT-3): CPT

## 2023-04-04 PROCEDURE — 36415 COLL VENOUS BLD VENIPUNCTURE: CPT

## 2023-04-04 PROCEDURE — 84439 ASSAY OF FREE THYROXINE: CPT

## 2023-04-04 PROCEDURE — 84443 ASSAY THYROID STIM HORMONE: CPT

## 2023-04-04 PROCEDURE — 86376 MICROSOMAL ANTIBODY EACH: CPT

## 2023-04-05 LAB — THYROPEROXIDASE AB SERPL-ACNC: 481 IU/ML (ref 0–34)

## 2023-05-17 RX ORDER — VARENICLINE TARTRATE 1 MG/1
1 TABLET, FILM COATED ORAL 2 TIMES DAILY
Qty: 60 TABLET | Refills: 2 | Status: SHIPPED | OUTPATIENT
Start: 2023-05-17

## 2023-06-12 RX ORDER — AZITHROMYCIN 250 MG/1
TABLET, FILM COATED ORAL
Qty: 1 TABLET | Refills: 0 | Status: SHIPPED | OUTPATIENT
Start: 2023-06-12 | End: 2023-06-12

## 2023-06-12 RX ORDER — AZITHROMYCIN 250 MG/1
TABLET, FILM COATED ORAL
Qty: 6 TABLET | Refills: 0 | Status: SHIPPED | OUTPATIENT
Start: 2023-06-12

## 2023-08-07 ENCOUNTER — OFFICE VISIT (OUTPATIENT)
Dept: FAMILY MEDICINE CLINIC | Facility: CLINIC | Age: 53
End: 2023-08-07
Payer: COMMERCIAL

## 2023-08-07 ENCOUNTER — LAB (OUTPATIENT)
Dept: LAB | Facility: HOSPITAL | Age: 53
End: 2023-08-07
Payer: COMMERCIAL

## 2023-08-07 VITALS
BODY MASS INDEX: 24.49 KG/M2 | HEIGHT: 65 IN | RESPIRATION RATE: 16 BRPM | HEART RATE: 96 BPM | WEIGHT: 147 LBS | OXYGEN SATURATION: 96 % | TEMPERATURE: 98.3 F | SYSTOLIC BLOOD PRESSURE: 108 MMHG | DIASTOLIC BLOOD PRESSURE: 72 MMHG

## 2023-08-07 DIAGNOSIS — E78.2 MIXED HYPERLIPIDEMIA: Primary | ICD-10-CM

## 2023-08-07 DIAGNOSIS — E03.9 HYPOTHYROIDISM, UNSPECIFIED TYPE: ICD-10-CM

## 2023-08-07 LAB
ALBUMIN SERPL-MCNC: 5 G/DL (ref 3.5–5.2)
ALBUMIN/GLOB SERPL: 1.6 G/DL
ALP SERPL-CCNC: 90 U/L (ref 39–117)
ALT SERPL W P-5'-P-CCNC: 15 U/L (ref 1–33)
ANION GAP SERPL CALCULATED.3IONS-SCNC: 12.8 MMOL/L (ref 5–15)
AST SERPL-CCNC: 24 U/L (ref 1–32)
BASOPHILS # BLD AUTO: 0.05 10*3/MM3 (ref 0–0.2)
BASOPHILS NFR BLD AUTO: 0.8 % (ref 0–1.5)
BILIRUB SERPL-MCNC: 0.6 MG/DL (ref 0–1.2)
BUN SERPL-MCNC: 17 MG/DL (ref 6–20)
BUN/CREAT SERPL: 17.3 (ref 7–25)
CALCIUM SPEC-SCNC: 10.1 MG/DL (ref 8.6–10.5)
CHLORIDE SERPL-SCNC: 102 MMOL/L (ref 98–107)
CHOLEST SERPL-MCNC: 220 MG/DL (ref 0–200)
CO2 SERPL-SCNC: 26.2 MMOL/L (ref 22–29)
CREAT SERPL-MCNC: 0.98 MG/DL (ref 0.57–1)
DEPRECATED RDW RBC AUTO: 43.8 FL (ref 37–54)
EGFRCR SERPLBLD CKD-EPI 2021: 68.7 ML/MIN/1.73
EOSINOPHIL # BLD AUTO: 0.11 10*3/MM3 (ref 0–0.4)
EOSINOPHIL NFR BLD AUTO: 1.7 % (ref 0.3–6.2)
ERYTHROCYTE [DISTWIDTH] IN BLOOD BY AUTOMATED COUNT: 12.8 % (ref 12.3–15.4)
GLOBULIN UR ELPH-MCNC: 3.1 GM/DL
GLUCOSE SERPL-MCNC: 89 MG/DL (ref 65–99)
HCT VFR BLD AUTO: 53.3 % (ref 34–46.6)
HDLC SERPL QL: 3.38
HDLC SERPL-MCNC: 65 MG/DL (ref 40–60)
HGB BLD-MCNC: 17.8 G/DL (ref 12–15.9)
IMM GRANULOCYTES # BLD AUTO: 0.03 10*3/MM3 (ref 0–0.05)
IMM GRANULOCYTES NFR BLD AUTO: 0.5 % (ref 0–0.5)
LDLC SERPL CALC-MCNC: 129 MG/DL (ref 0–100)
LYMPHOCYTES # BLD AUTO: 2.28 10*3/MM3 (ref 0.7–3.1)
LYMPHOCYTES NFR BLD AUTO: 35 % (ref 19.6–45.3)
MCH RBC QN AUTO: 31.1 PG (ref 26.6–33)
MCHC RBC AUTO-ENTMCNC: 33.4 G/DL (ref 31.5–35.7)
MCV RBC AUTO: 93.2 FL (ref 79–97)
MONOCYTES # BLD AUTO: 0.46 10*3/MM3 (ref 0.1–0.9)
MONOCYTES NFR BLD AUTO: 7.1 % (ref 5–12)
NEUTROPHILS NFR BLD AUTO: 3.58 10*3/MM3 (ref 1.7–7)
NEUTROPHILS NFR BLD AUTO: 54.9 % (ref 42.7–76)
NRBC BLD AUTO-RTO: 0 /100 WBC (ref 0–0.2)
PLATELET # BLD AUTO: 236 10*3/MM3 (ref 140–450)
PMV BLD AUTO: 11.8 FL (ref 6–12)
POTASSIUM SERPL-SCNC: 4.5 MMOL/L (ref 3.5–5.2)
PROT SERPL-MCNC: 8.1 G/DL (ref 6–8.5)
RBC # BLD AUTO: 5.72 10*6/MM3 (ref 3.77–5.28)
SODIUM SERPL-SCNC: 141 MMOL/L (ref 136–145)
T4 FREE SERPL-MCNC: 1.84 NG/DL (ref 0.93–1.7)
TRIGL SERPL-MCNC: 150 MG/DL (ref 0–150)
TSH SERPL DL<=0.05 MIU/L-ACNC: 1.69 UIU/ML (ref 0.27–4.2)
VLDLC SERPL-MCNC: 26 MG/DL (ref 5–40)
WBC NRBC COR # BLD: 6.51 10*3/MM3 (ref 3.4–10.8)

## 2023-08-07 PROCEDURE — 80061 LIPID PANEL: CPT | Performed by: INTERNAL MEDICINE

## 2023-08-07 PROCEDURE — 80053 COMPREHEN METABOLIC PANEL: CPT | Performed by: INTERNAL MEDICINE

## 2023-08-07 PROCEDURE — 84443 ASSAY THYROID STIM HORMONE: CPT | Performed by: INTERNAL MEDICINE

## 2023-08-07 PROCEDURE — 84439 ASSAY OF FREE THYROXINE: CPT | Performed by: INTERNAL MEDICINE

## 2023-08-07 PROCEDURE — 85025 COMPLETE CBC W/AUTO DIFF WBC: CPT | Performed by: INTERNAL MEDICINE

## 2023-08-07 PROCEDURE — 99213 OFFICE O/P EST LOW 20 MIN: CPT | Performed by: INTERNAL MEDICINE

## 2023-08-07 NOTE — PROGRESS NOTES
Subjective   Angi Fuentes is a 53 y.o. female.     Chief Complaint   Patient presents with    Hyperlipidemia   Thyroid    Hyperlipidemia  Pertinent negatives include no chest pain or shortness of breath.    Here follow-up for hyperlipidemia, thyroid.  She is followed by Dr. Chi for her thyroid.  Denies any chest pain shortness of breath.  Continues to smoke.  The following portions of the patient's history were reviewed and updated as appropriate: allergies, current medications, past family history, past medical history, past social history, past surgical history and problem list.    Review of Systems   Constitutional:  Negative for activity change, appetite change, fatigue and fever.   Eyes:  Negative for blurred vision and double vision.   Respiratory: Negative.  Negative for shortness of breath.    Cardiovascular:  Negative for chest pain, palpitations and leg swelling.   Gastrointestinal: Negative.    Genitourinary:  Negative for hematuria.   Neurological:  Negative for dizziness, syncope, light-headedness and headache.     Allergies   Allergen Reactions    Levofloxacin Nausea Only     12/20/19: She states she had some leftover Levaquin that she took 2 doses of. She states she gets mild GI side effects to Levaquin if she takes it without food, but otherwise tolerates it well.        Current Outpatient Medications on File Prior to Visit   Medication Sig Dispense Refill    levothyroxine (SYNTHROID, LEVOTHROID) 75 MCG tablet Take 1 tablet by mouth Daily. Patient says that she is taking 100mcg M-Saturday and Sunday she takes a half of a pill      azithromycin (ZITHROMAX) 250 MG tablet Take 2 tablets the first day, then 1 tablet daily for 4 days. 6 tablet 0    metroNIDAZOLE (Flagyl) 500 MG tablet Take 1 tablet by mouth 3 (Three) Times a Day. 30 tablet 0    promethazine-dextromethorphan (PROMETHAZINE-DM) 6.25-15 MG/5ML syrup Take 5 mL by mouth 4 (Four) Times a Day As Needed for Cough. 118 mL 0    varenicline  "(Chantix Continuing Month Kirit) 1 MG tablet Take 1 tablet by mouth 2 (Two) Times a Day. (Patient not taking: Reported on 8/7/2023) 60 tablet 2     No current facility-administered medications on file prior to visit.       Family History   Problem Relation Age of Onset    Breast cancer Maternal Aunt     Colon polyps Maternal Aunt     Colon polyps Sister     COPD Sister         Passed away 7-    Hodgkin's lymphoma Mother     Cancer Mother         Hodgkin's disease    Heart failure Father     Colon cancer Neg Hx        Past Medical History:   Diagnosis Date    Diverticulosis     Hypothyroidism     Melanoma 2011    left lower leg    PONV (postoperative nausea and vomiting)     Presence of pessary     Scoliosis 2000       Past Surgical History:   Procedure Laterality Date    COLONOSCOPY N/A 07/31/2019    Procedure: COLONOSCOPY WITH BIOPSIES;  Surgeon: Constance Rehman MD;  Location: [facility];  Service: Gastroenterology    DILATATION AND CURETTAGE  2000    HYSTERECTOMY         Social History     Socioeconomic History    Marital status:    Tobacco Use    Smoking status: Every Day     Packs/day: 0.50     Years: 10.00     Pack years: 5.00     Types: Cigarettes    Smokeless tobacco: Never   Vaping Use    Vaping Use: Never used   Substance and Sexual Activity    Alcohol use: Yes     Comment: rare use    Drug use: No    Sexual activity: Yes     Partners: Male     Birth control/protection: Vasectomy       Patient Active Problem List   Diagnosis    Melanoma    Hypothyroidism    Hyperlipidemia    Colon cancer screening    Diverticulosis       /72 (BP Location: Left arm, Patient Position: Sitting, Cuff Size: Adult)   Pulse 96   Temp 98.3 øF (36.8 øC) (Temporal)   Resp 16   Ht 165.1 cm (65\")   Wt 66.7 kg (147 lb)   LMP  (LMP Unknown) Comment: pt has 1 overie  SpO2 96%   BMI 24.46 kg/mý   Body mass index is 24.46 kg/mý.    Objective   Physical Exam  Vitals and nursing note reviewed.   Constitutional:       " Appearance: She is well-developed.   Eyes:      Pupils: Pupils are equal, round, and reactive to light.   Neck:      Thyroid: No thyromegaly.      Vascular: No JVD.      Trachea: No tracheal deviation.   Cardiovascular:      Rate and Rhythm: Normal rate and regular rhythm.      Heart sounds: No murmur heard.  Pulmonary:      Effort: Pulmonary effort is normal. No respiratory distress.      Breath sounds: Normal breath sounds. No wheezing.   Abdominal:      General: Bowel sounds are normal. There is no distension.      Palpations: Abdomen is soft. There is no mass.      Tenderness: There is no abdominal tenderness. There is no right CVA tenderness, left CVA tenderness, guarding or rebound.      Hernia: No hernia is present.   Musculoskeletal:      Cervical back: Normal range of motion and neck supple.   Lymphadenopathy:      Cervical: No cervical adenopathy.   Neurological:      General: No focal deficit present.      Mental Status: She is alert and oriented to person, place, and time.   Psychiatric:         Mood and Affect: Mood normal.         Behavior: Behavior normal.         Assessment & Plan   Diagnoses and all orders for this visit:    1. Mixed hyperlipidemia (Primary)  -     CBC & Differential  -     Lipid Panel With / Chol / HDL Ratio  -     Comprehensive Metabolic Panel  -     TSH  -     T4, Free    2. Hypothyroidism, unspecified type  -     CBC & Differential  -     Lipid Panel With / Chol / HDL Ratio  -     Comprehensive Metabolic Panel  -     TSH  -     T4, Free    Continue diet and exercise.  Strongly recommend patient to stop smoking.  Her sister who is 58 diagnosed with metastatic lung cancer.  Discussed previous labs with patient.  Continue taking Synthroid.  Follow-up with Dr. Chi.  She is not taking the Chantix her insurance did not pay for it.  Discussed with patient regarding getting CAT scan of the lung for cancer screening.  She is going to call around and see which one will be cheaper.   Her insurance does not cover it.  All her problems are chronic and stable.  Return in 6 months.  EHR dragon/transcription disclaimer:  Part of this note are created by electronic transcription/translation of spoken language to printed text and thus may lead to erroneous, or at times, nonsensical words or phrases inadvertently transcribed.  Although I have reviewed for such errors, some may still exist.         Answers submitted by the patient for this visit:  Primary Reason for Visit (Submitted on 7/31/2023)  What is the primary reason for your visit?: Physical

## 2023-11-13 ENCOUNTER — TELEPHONE (OUTPATIENT)
Dept: FAMILY MEDICINE CLINIC | Facility: CLINIC | Age: 53
End: 2023-11-13
Payer: COMMERCIAL

## 2023-11-13 NOTE — TELEPHONE ENCOUNTER
"Caller: Angi Fuentes \"Florence\"    Relationship: Self    Best call back number: [phone]    What medication are you requesting: FOR DIVERTICULITIS (OUT OF MEDICATION) AND FOR UPPER RESPIRATORY CONGESTION    What are your current symptoms: SINUS HEADACHE, COUGH, FEELS LIKE CONGESTION IS GOING INTO CHEST. PATIENT STATES HER SON WAS DIAGNOSED WITH VIRAL UPPER RESPIRATORY INFECTION OVER THE WEEKEND     How long have you been experiencing symptoms:     Have you had these symptoms before:    [] Yes  [] No    Have you been treated for these symptoms before:   [] Yes  [] No    If a prescription is needed, what is your preferred pharmacy and phone number: [id number] [place] [place] [facility] [address] - [phone]  - [phone] FX     Additional notes:HAS BEEN TAKING ROBITUSSIN DM, ALLERGY PILL, TYLENOL SINUS AND HEADACHE          "

## 2023-11-14 ENCOUNTER — OFFICE VISIT (OUTPATIENT)
Dept: FAMILY MEDICINE CLINIC | Facility: CLINIC | Age: 53
End: 2023-11-14
Payer: COMMERCIAL

## 2023-11-14 ENCOUNTER — TELEPHONE (OUTPATIENT)
Dept: FAMILY MEDICINE CLINIC | Facility: CLINIC | Age: 53
End: 2023-11-14

## 2023-11-14 VITALS
OXYGEN SATURATION: 95 % | DIASTOLIC BLOOD PRESSURE: 78 MMHG | BODY MASS INDEX: 24.99 KG/M2 | HEIGHT: 65 IN | SYSTOLIC BLOOD PRESSURE: 102 MMHG | RESPIRATION RATE: 16 BRPM | WEIGHT: 150 LBS | TEMPERATURE: 98.6 F | HEART RATE: 99 BPM

## 2023-11-14 DIAGNOSIS — R05.9 COUGH, UNSPECIFIED TYPE: Primary | ICD-10-CM

## 2023-11-14 DIAGNOSIS — J01.90 ACUTE NON-RECURRENT SINUSITIS, UNSPECIFIED LOCATION: ICD-10-CM

## 2023-11-14 LAB
EXPIRATION DATE: NORMAL
FLUAV AG UPPER RESP QL IA.RAPID: NOT DETECTED
FLUBV AG UPPER RESP QL IA.RAPID: NOT DETECTED
INTERNAL CONTROL: NORMAL
Lab: NORMAL
SARS-COV-2 AG UPPER RESP QL IA.RAPID: NOT DETECTED

## 2023-11-14 PROCEDURE — 99213 OFFICE O/P EST LOW 20 MIN: CPT | Performed by: INTERNAL MEDICINE

## 2023-11-14 PROCEDURE — 87428 SARSCOV & INF VIR A&B AG IA: CPT | Performed by: INTERNAL MEDICINE

## 2023-11-14 RX ORDER — AMOXICILLIN AND CLAVULANATE POTASSIUM 875; 125 MG/1; MG/1
1 TABLET, FILM COATED ORAL 2 TIMES DAILY
Qty: 20 TABLET | Refills: 0 | Status: SHIPPED | OUTPATIENT
Start: 2023-11-14

## 2023-11-14 RX ORDER — DEXTROMETHORPHAN HYDROBROMIDE AND PROMETHAZINE HYDROCHLORIDE 15; 6.25 MG/5ML; MG/5ML
5 SYRUP ORAL 3 TIMES DAILY PRN
Qty: 200 ML | Refills: 0 | Status: SHIPPED | OUTPATIENT
Start: 2023-11-14

## 2023-11-14 NOTE — PROGRESS NOTES
Subjective   Angi Fuentes is a 53 y.o. female.     Chief Complaint   Patient presents with    Cough    Nasal Congestion    Sore Throat    Headache       Cough  Associated symptoms include headaches and a sore throat. Pertinent negatives include no shortness of breath or wheezing.   Sore Throat   Associated symptoms include congestion, coughing and headaches. Pertinent negatives include no shortness of breath or stridor.   Headache     Patient here for cough, congestion, sore throat and headache.  Reports no shortness of breath or chest pain.  Son was sick with viral infection.  Her slashes have bloody sinus drainage, coughing up yellow sputum.  No shortness of breath, had vomiting yesterday after coughing spell.  The following portions of the patient's history were reviewed and updated as appropriate: allergies, current medications, past family history, past medical history, past social history, past surgical history and problem list.    Review of Systems   HENT:  Positive for congestion, sinus pressure and sore throat.    Respiratory:  Positive for cough. Negative for choking, chest tightness, shortness of breath, wheezing and stridor.    Gastrointestinal: Negative.        Allergies   Allergen Reactions    Levofloxacin Nausea Only     12/20/19: She states she had some leftover Levaquin that she took 2 doses of. She states she gets mild GI side effects to Levaquin if she takes it without food, but otherwise tolerates it well.        Current Outpatient Medications on File Prior to Visit   Medication Sig Dispense Refill    levothyroxine sodium (TIROSINT) 100 MCG capsule Take 1 capsule by mouth Daily. Patient says that she is taking 100mcg M-Saturday and Sunday she takes a half of a pill      azithromycin (ZITHROMAX) 250 MG tablet Take 2 tablets the first day, then 1 tablet daily for 4 days. 6 tablet 0    metroNIDAZOLE (Flagyl) 500 MG tablet Take 1 tablet by mouth 3 (Three) Times a Day. 30 tablet 0     "promethazine-dextromethorphan (PROMETHAZINE-DM) 6.25-15 MG/5ML syrup Take 5 mL by mouth 4 (Four) Times a Day As Needed for Cough. 118 mL 0    varenicline (Chantix Continuing Month Pak) 1 MG tablet Take 1 tablet by mouth 2 (Two) Times a Day. 60 tablet 2     No current facility-administered medications on file prior to visit.       Family History   Problem Relation Age of Onset    Breast cancer Maternal Aunt     Colon polyps Maternal Aunt     Colon polyps Sister     COPD Sister         Passed away 7-    Hodgkin's lymphoma Mother     Cancer Mother         Hodgkin’s disease    Heart failure Father     Colon cancer Neg Hx        Past Medical History:   Diagnosis Date    Diverticulosis     Hypothyroidism     Melanoma 2011    left lower leg    PONV (postoperative nausea and vomiting)     Presence of pessary     Scoliosis 2000       Past Surgical History:   Procedure Laterality Date    COLONOSCOPY N/A 07/31/2019    Procedure: COLONOSCOPY WITH BIOPSIES;  Surgeon: Consatnce Rehman MD;  Location: [facility];  Service: Gastroenterology    DILATATION AND CURETTAGE  2000    HYSTERECTOMY         Social History     Socioeconomic History    Marital status:    Tobacco Use    Smoking status: Every Day     Packs/day: 0.50     Years: 10.00     Additional pack years: 0.00     Total pack years: 5.00     Types: Cigarettes     Passive exposure: Current    Smokeless tobacco: Never   Vaping Use    Vaping Use: Never used   Substance and Sexual Activity    Alcohol use: Yes     Comment: rare use    Drug use: No    Sexual activity: Yes     Partners: Male     Birth control/protection: Vasectomy       Patient Active Problem List   Diagnosis    Melanoma    Hypothyroidism    Hyperlipidemia    Colon cancer screening    Diverticulosis       /78   Pulse 99   Temp 98.6 °F (37 °C)   Resp 16   Ht 165.1 cm (65\")   Wt 68 kg (150 lb)   LMP  (LMP Unknown) Comment: pt has 1 overie  SpO2 95%   BMI 24.96 kg/m²   Body mass index is 24.96 " kg/m².    Objective   Physical Exam  Vitals and nursing note reviewed.   Constitutional:       Appearance: She is well-developed.   HENT:      Right Ear: Tympanic membrane normal.      Left Ear: Tympanic membrane normal.      Mouth/Throat:      Mouth: Mucous membranes are moist.      Pharynx: Oropharynx is clear.   Neck:      Thyroid: No thyromegaly.      Vascular: No JVD.      Trachea: No tracheal deviation.   Cardiovascular:      Rate and Rhythm: Normal rate and regular rhythm.      Heart sounds: No murmur heard.  Pulmonary:      Effort: Pulmonary effort is normal. No respiratory distress.      Breath sounds: Normal breath sounds. No wheezing.   Musculoskeletal:      Cervical back: Normal range of motion and neck supple.   Lymphadenopathy:      Cervical: No cervical adenopathy.   Neurological:      Mental Status: She is alert.           Assessment & Plan   Diagnoses and all orders for this visit:    1. Cough, unspecified type (Primary)  -     POCT SARS-CoV-2 Antigen DUNG + Flu    2. Acute non-recurrent sinusitis, unspecified location    Other orders  -     amoxicillin-clavulanate (AUGMENTIN) 875-125 MG per tablet; Take 1 tablet by mouth 2 (Two) Times a Day.  Dispense: 20 tablet; Refill: 0  -     promethazine-dextromethorphan (PROMETHAZINE-DM) 6.25-15 MG/5ML syrup; Take 5 mL by mouth 3 (Three) Times a Day As Needed for Cough.  Dispense: 200 mL; Refill: 0    Patient prescribed Augmentin.  Also has cough medicine.  COVID-19, flu, were negative.  Continue over-the-counter medications.  Return if no better.  Lung sounds were clear today.  No need for chest x-ray.  Keep follow-up.  EHR dragon/transcription disclaimer:  Part of this note are created by electronic transcription/translation of spoken language to printed text and thus may lead to erroneous, or at times, nonsensical words or phrases inadvertently transcribed.  Although I have reviewed for such errors, some may still exist.           Answers submitted by the  patient for this visit:  Other (Submitted on 11/13/2023)  Please describe your symptoms.: Sinus Drainage , Cough, Chest irration when cough, Coughing up green mucus, Headache but its better  Have you had these symptoms before?: Yes  How long have you been having these symptoms?: Greater than 2 weeks  Please list any medications you are currently taking for this condition.: Levothyroxine 100mcg, Half pill on sunday , Whole pill everyday but Sunday  Please describe any probable cause for these symptoms. : I feel I had a sinus infection bc I was getting blood out of my nose along with green mucous and it has went in my chest. I have been having a tremendous amount of drainage from sinuses causing me to gag and throw up while coughing. I now taste infection when I cough up mucus.  Primary Reason for Visit (Submitted on 11/13/2023)  What is the primary reason for your visit?: Other

## 2023-11-17 RX ORDER — METRONIDAZOLE 500 MG/1
500 TABLET ORAL 3 TIMES DAILY
Qty: 30 TABLET | Refills: 0 | Status: SHIPPED | OUTPATIENT
Start: 2023-11-17

## 2024-02-07 ENCOUNTER — OFFICE VISIT (OUTPATIENT)
Dept: FAMILY MEDICINE CLINIC | Facility: CLINIC | Age: 54
End: 2024-02-07
Payer: COMMERCIAL

## 2024-02-07 ENCOUNTER — PATIENT ROUNDING (BHMG ONLY) (OUTPATIENT)
Dept: FAMILY MEDICINE CLINIC | Facility: CLINIC | Age: 54
End: 2024-02-07

## 2024-02-07 VITALS
OXYGEN SATURATION: 91 % | HEIGHT: 65 IN | BODY MASS INDEX: 24.99 KG/M2 | WEIGHT: 150 LBS | SYSTOLIC BLOOD PRESSURE: 102 MMHG | RESPIRATION RATE: 16 BRPM | DIASTOLIC BLOOD PRESSURE: 62 MMHG | HEART RATE: 88 BPM | TEMPERATURE: 98.1 F

## 2024-02-07 DIAGNOSIS — E03.9 HYPOTHYROIDISM, UNSPECIFIED TYPE: Primary | ICD-10-CM

## 2024-02-07 DIAGNOSIS — Z00.00 ANNUAL PHYSICAL EXAM: ICD-10-CM

## 2024-02-07 DIAGNOSIS — E78.2 MIXED HYPERLIPIDEMIA: ICD-10-CM

## 2024-02-07 LAB
BILIRUB BLD-MCNC: NEGATIVE MG/DL
EXPIRATION DATE: NORMAL
GLUCOSE UR STRIP-MCNC: NEGATIVE MG/DL
KETONES UR QL: NEGATIVE
LEUKOCYTE EST, POC: NEGATIVE
Lab: NORMAL
NITRITE UR-MCNC: NEGATIVE MG/ML
PH UR: 6 [PH] (ref 5–8)
PROT UR STRIP-MCNC: NEGATIVE MG/DL
RBC # UR STRIP: NEGATIVE /UL
SP GR UR: 1.01 (ref 1–1.03)
UROBILINOGEN UR QL: NORMAL

## 2024-02-07 NOTE — PROGRESS NOTES
Venipuncture Blood Specimen Collection  Venipuncture performed in left arm by Nohemy Hargrove MA with good hemostasis. Patient tolerated the procedure well without complications.   02/07/24   Nohemy Hargrove MA

## 2024-02-07 NOTE — PROGRESS NOTES
A Percolate message has been sent to the patient for PATIENT ROUNDING with [facility]

## 2024-02-07 NOTE — PROGRESS NOTES
Subjective   Angi Fuentes is a 53 y.o. female.     Chief Complaint   Patient presents with    Hypothyroidism    Hyperlipidemia   Annual physical    Hypothyroidism    Hyperlipidemia  Exacerbating diseases include hypothyroidism.      Patient here for annual physical, follow-up on hypothyroidism, hyperlipidemia.  She follows with endocrinologist for the hypothyroidism.  Has no chest pain or shortness of breath.  Doing well.  Per patient sister passed away at 58 from congestive heart failure, dad had congestive heart failure.  The following portions of the patient's history were reviewed and updated as appropriate: allergies, current medications, past family history, past medical history, past social history, past surgical history and problem list.    Review of Systems   Constitutional: Negative.    HENT: Negative.     Eyes: Negative.    Respiratory: Negative.     Cardiovascular: Negative.    Gastrointestinal: Negative.    Endocrine: Negative.    Genitourinary: Negative.    Musculoskeletal: Negative.    Skin: Negative.    Allergic/Immunologic: Negative.    Neurological: Negative.    Hematological: Negative.    Psychiatric/Behavioral: Negative.         Allergies   Allergen Reactions    Levofloxacin Nausea Only     12/20/19: She states she had some leftover Levaquin that she took 2 doses of. She states she gets mild GI side effects to Levaquin if she takes it without food, but otherwise tolerates it well.        Current Outpatient Medications on File Prior to Visit   Medication Sig Dispense Refill    levothyroxine sodium (TIROSINT) 100 MCG capsule Take 1 capsule by mouth Daily. Patient says that she is taking 100mcg M-Saturday and Sunday she takes a half of a pill      amoxicillin-clavulanate (AUGMENTIN) 875-125 MG per tablet Take 1 tablet by mouth 2 (Two) Times a Day. 20 tablet 0    metroNIDAZOLE (Flagyl) 500 MG tablet Take 1 tablet by mouth 3 (Three) Times a Day. 30 tablet 0    promethazine-dextromethorphan  (PROMETHAZINE-DM) 6.25-15 MG/5ML syrup Take 5 mL by mouth 4 (Four) Times a Day As Needed for Cough. 118 mL 0    varenicline (Chantix Continuing Month Kirit) 1 MG tablet Take 1 tablet by mouth 2 (Two) Times a Day. (Patient not taking: Reported on 2/7/2024) 60 tablet 2    [DISCONTINUED] promethazine-dextromethorphan (PROMETHAZINE-DM) 6.25-15 MG/5ML syrup Take 5 mL by mouth 3 (Three) Times a Day As Needed for Cough. 200 mL 0     No current facility-administered medications on file prior to visit.       Family History   Problem Relation Age of Onset    Breast cancer Maternal Aunt     Colon polyps Maternal Aunt     Colon polyps Sister     COPD Sister         Passed away 7-    Hodgkin's lymphoma Mother     Cancer Mother         Hodgkin’s disease    Heart failure Father     Colon cancer Neg Hx        Past Medical History:   Diagnosis Date    Diverticulosis     Hypothyroidism     Melanoma 2011    left lower leg    PONV (postoperative nausea and vomiting)     Presence of pessary     Scoliosis 2000       Past Surgical History:   Procedure Laterality Date    COLONOSCOPY N/A 07/31/2019    Procedure: COLONOSCOPY WITH BIOPSIES;  Surgeon: Constance Rehman MD;  Location: [facility];  Service: Gastroenterology    DILATATION AND CURETTAGE  2000    HYSTERECTOMY         Social History     Socioeconomic History    Marital status:    Tobacco Use    Smoking status: Every Day     Packs/day: 0.50     Years: 10.00     Additional pack years: 0.00     Total pack years: 5.00     Types: Cigarettes     Passive exposure: Current    Smokeless tobacco: Never   Vaping Use    Vaping Use: Never used   Substance and Sexual Activity    Alcohol use: Yes     Comment: rare use    Drug use: No    Sexual activity: Yes     Partners: Male     Birth control/protection: Vasectomy       Patient Active Problem List   Diagnosis    Melanoma    Hypothyroidism    Hyperlipidemia    Colon cancer screening    Diverticulosis       /62   Pulse 88   Temp  "98.1 °F (36.7 °C)   Resp 16   Ht 165.1 cm (65\")   Wt 68 kg (150 lb)   LMP  (LMP Unknown) Comment: pt has 1 overie  SpO2 91%   BMI 24.96 kg/m²   Body mass index is 24.96 kg/m².    Objective   Physical Exam  Constitutional:       General: She is not in acute distress.     Appearance: She is well-developed. She is not diaphoretic.   HENT:      Head: Normocephalic and atraumatic.      Right Ear: External ear normal.      Left Ear: External ear normal.   Eyes:      Conjunctiva/sclera: Conjunctivae normal.      Pupils: Pupils are equal, round, and reactive to light.   Neck:      Thyroid: No thyromegaly.      Vascular: No JVD.      Trachea: No tracheal deviation.   Cardiovascular:      Rate and Rhythm: Normal rate and regular rhythm.      Heart sounds: Normal heart sounds. No murmur heard.     No friction rub. No gallop.   Pulmonary:      Effort: Pulmonary effort is normal. No respiratory distress.      Breath sounds: Normal breath sounds. No stridor. No wheezing or rales.   Chest:      Chest wall: No tenderness.   Abdominal:      General: Bowel sounds are normal. There is no distension.      Palpations: Abdomen is soft. There is no mass.      Tenderness: There is no abdominal tenderness. There is no guarding or rebound.      Hernia: No hernia is present.   Musculoskeletal:         General: No tenderness or deformity. Normal range of motion.      Cervical back: Normal range of motion and neck supple.   Lymphadenopathy:      Cervical: No cervical adenopathy.   Skin:     General: Skin is warm and dry.      Coloration: Skin is not pale.      Findings: No erythema or rash.   Neurological:      General: No focal deficit present.      Mental Status: She is alert and oriented to person, place, and time. Mental status is at baseline.      Comments: Cranial nerves II through XII grossly intact, DTR 2+, motor 5/5   Psychiatric:         Behavior: Behavior normal.         Thought Content: Thought content normal.         " Judgment: Judgment normal.           Assessment & Plan   Diagnoses and all orders for this visit:    1. Hypothyroidism, unspecified type (Primary)  -     CBC & Differential  -     Comprehensive Metabolic Panel  -     Lipid Panel With / Chol / HDL Ratio  -     TSH  -     T4, Free  -     POC Urinalysis Dipstick, Automated    2. Mixed hyperlipidemia  -     CBC & Differential  -     Comprehensive Metabolic Panel  -     Lipid Panel With / Chol / HDL Ratio  -     TSH  -     T4, Free  -     POC Urinalysis Dipstick, Automated    3. Annual physical exam  -     ECG 12 Lead  -     CBC & Differential  -     Comprehensive Metabolic Panel  -     Lipid Panel With / Chol / HDL Ratio  -     TSH  -     T4, Free  -     POC Urinalysis Dipstick, Automated    Continue diet and exercise.  Continue Synthroid.  Keep follow-up with the endocrinologist.  EKG showed normal sinus rhythm, has no changes since 2018.  UA was normal.  Discussed with patient getting CAT scan of the heart calcium scoring.  Problems to get it done.  Otherwise keep up with the vaccinations, colonoscopy every 10 years, and mammograms every year, Pap smear every year.  I will see her back in 6 months.         Answers submitted by the patient for this visit:  Other (Submitted on 2/7/2024)  Please describe your symptoms.: None, regular routine visit blood work  Have you had these symptoms before?: No  How long have you been having these symptoms?: Greater than 2 weeks  Please list any medications you are currently taking for this condition.: None  Please describe any probable cause for these symptoms. : None  Primary Reason for Visit (Submitted on 2/7/2024)  What is the primary reason for your visit?: Other

## 2024-02-08 LAB
ALBUMIN SERPL-MCNC: 4.8 G/DL (ref 3.5–5.2)
ALBUMIN/GLOB SERPL: 1.9 G/DL
ALP SERPL-CCNC: 80 U/L (ref 39–117)
ALT SERPL-CCNC: 13 U/L (ref 1–33)
AST SERPL-CCNC: 20 U/L (ref 1–32)
BASOPHILS # BLD AUTO: 0.05 10*3/MM3 (ref 0–0.2)
BASOPHILS NFR BLD AUTO: 0.8 % (ref 0–1.5)
BILIRUB SERPL-MCNC: 0.5 MG/DL (ref 0–1.2)
BUN SERPL-MCNC: 14 MG/DL (ref 6–20)
BUN/CREAT SERPL: 14.7 (ref 7–25)
CALCIUM SERPL-MCNC: 10 MG/DL (ref 8.6–10.5)
CHLORIDE SERPL-SCNC: 104 MMOL/L (ref 98–107)
CHOLEST SERPL-MCNC: 195 MG/DL (ref 0–200)
CHOLEST/HDLC SERPL: 3.05 {RATIO}
CO2 SERPL-SCNC: 26.5 MMOL/L (ref 22–29)
CREAT SERPL-MCNC: 0.95 MG/DL (ref 0.57–1)
EGFRCR SERPLBLD CKD-EPI 2021: 71.3 ML/MIN/1.73
EOSINOPHIL # BLD AUTO: 0.07 10*3/MM3 (ref 0–0.4)
EOSINOPHIL NFR BLD AUTO: 1.2 % (ref 0.3–6.2)
ERYTHROCYTE [DISTWIDTH] IN BLOOD BY AUTOMATED COUNT: 12.4 % (ref 12.3–15.4)
GLOBULIN SER CALC-MCNC: 2.5 GM/DL
GLUCOSE SERPL-MCNC: 91 MG/DL (ref 65–99)
HCT VFR BLD AUTO: 49 % (ref 34–46.6)
HDLC SERPL-MCNC: 64 MG/DL (ref 40–60)
HGB BLD-MCNC: 16.3 G/DL (ref 12–15.9)
IMM GRANULOCYTES # BLD AUTO: 0.01 10*3/MM3 (ref 0–0.05)
IMM GRANULOCYTES NFR BLD AUTO: 0.2 % (ref 0–0.5)
LDLC SERPL CALC-MCNC: 112 MG/DL (ref 0–100)
LYMPHOCYTES # BLD AUTO: 2.02 10*3/MM3 (ref 0.7–3.1)
LYMPHOCYTES NFR BLD AUTO: 34.3 % (ref 19.6–45.3)
MCH RBC QN AUTO: 30.9 PG (ref 26.6–33)
MCHC RBC AUTO-ENTMCNC: 33.3 G/DL (ref 31.5–35.7)
MCV RBC AUTO: 93 FL (ref 79–97)
MONOCYTES # BLD AUTO: 0.37 10*3/MM3 (ref 0.1–0.9)
MONOCYTES NFR BLD AUTO: 6.3 % (ref 5–12)
NEUTROPHILS # BLD AUTO: 3.37 10*3/MM3 (ref 1.7–7)
NEUTROPHILS NFR BLD AUTO: 57.2 % (ref 42.7–76)
NRBC BLD AUTO-RTO: 0 /100 WBC (ref 0–0.2)
PLATELET # BLD AUTO: 231 10*3/MM3 (ref 140–450)
POTASSIUM SERPL-SCNC: 4.7 MMOL/L (ref 3.5–5.2)
PROT SERPL-MCNC: 7.3 G/DL (ref 6–8.5)
RBC # BLD AUTO: 5.27 10*6/MM3 (ref 3.77–5.28)
SODIUM SERPL-SCNC: 143 MMOL/L (ref 136–145)
T4 FREE SERPL-MCNC: 1.81 NG/DL (ref 0.93–1.7)
TRIGL SERPL-MCNC: 109 MG/DL (ref 0–150)
TSH SERPL DL<=0.005 MIU/L-ACNC: 0.41 UIU/ML (ref 0.27–4.2)
VLDLC SERPL CALC-MCNC: 19 MG/DL (ref 5–40)
WBC # BLD AUTO: 5.89 10*3/MM3 (ref 3.4–10.8)

## 2024-03-26 ENCOUNTER — PATIENT ROUNDING (BHMG ONLY) (OUTPATIENT)
Dept: URGENT CARE | Facility: CLINIC | Age: 54
End: 2024-03-26
Payer: COMMERCIAL

## 2024-03-26 NOTE — ED NOTES
Thank you for letting us care for you in your recent visit to our urgent care center. We would love to hear about your experience with us. Was this the first time you have visited our location?    We’re always looking for ways to make our patients’ experiences even better. Do you have any recommendations on ways we may improve?     I appreciate you taking the time to respond. Please be on the lookout for a survey about your recent visit from [employer] via text or email. We would greatly appreciate if you could fill that out and turn it back in. We want your voice to be heard and we value your feedback.   Thank you for choosing [facility] for your healthcare needs.

## 2024-04-02 ENCOUNTER — OFFICE VISIT (OUTPATIENT)
Dept: ORTHOPEDIC SURGERY | Facility: CLINIC | Age: 54
End: 2024-04-02
Payer: COMMERCIAL

## 2024-04-02 VITALS
SYSTOLIC BLOOD PRESSURE: 111 MMHG | WEIGHT: 150 LBS | BODY MASS INDEX: 24.99 KG/M2 | DIASTOLIC BLOOD PRESSURE: 75 MMHG | HEART RATE: 92 BPM | HEIGHT: 65 IN

## 2024-04-02 DIAGNOSIS — M25.562 LEFT KNEE PAIN, UNSPECIFIED CHRONICITY: Primary | ICD-10-CM

## 2024-04-02 DIAGNOSIS — M22.42 CHONDROMALACIA OF LEFT PATELLA: ICD-10-CM

## 2024-04-02 PROCEDURE — 99203 OFFICE O/P NEW LOW 30 MIN: CPT | Performed by: ORTHOPAEDIC SURGERY

## 2024-04-02 PROCEDURE — 73562 X-RAY EXAM OF KNEE 3: CPT | Performed by: ORTHOPAEDIC SURGERY

## 2024-04-02 RX ORDER — MELOXICAM 7.5 MG/1
7.5 TABLET ORAL DAILY
Qty: 30 TABLET | Refills: 5 | Status: SHIPPED | OUTPATIENT
Start: 2024-04-02

## 2024-04-02 RX ORDER — METHYLPREDNISOLONE 4 MG/1
TABLET ORAL
Qty: 21 TABLET | Refills: 0 | Status: SHIPPED | OUTPATIENT
Start: 2024-04-02

## 2024-04-02 NOTE — PROGRESS NOTES
Subjective: Left knee pain     Patient ID: Angi Fuentes is a 53 y.o. female.    Chief Complaint:    History of Present Illness 53-year-old female is seen for left knee pain that she has had for about 2 weeks maybe a little bit longer.  No history of trauma but is noted progressively persistent pain discomfort in the knee she describes as moderate 6 out of 10.  Notices a throbbing aching discomfort with swelling in the back of the knee.  Has trouble standing and climbing stairs.  She has tried Tylenol but no anti-inflammatory medication.       Social History     Occupational History    Not on file   Tobacco Use    Smoking status: Every Day     Current packs/day: 0.50     Average packs/day: 0.5 packs/day for 10.0 years (5.0 ttl pk-yrs)     Types: Cigarettes     Passive exposure: Current    Smokeless tobacco: Never   Vaping Use    Vaping status: Never Used   Substance and Sexual Activity    Alcohol use: Yes     Comment: rare use    Drug use: No    Sexual activity: Yes     Partners: Male     Birth control/protection: Vasectomy      Review of Systems      Past Medical History:   Diagnosis Date    Diverticulosis     Hypothyroidism     Melanoma 2011    left lower leg    PONV (postoperative nausea and vomiting)     Presence of pessary     Scoliosis 2000     Past Surgical History:   Procedure Laterality Date    COLONOSCOPY N/A 07/31/2019    Procedure: COLONOSCOPY WITH BIOPSIES;  Surgeon: Constance Rehman MD;  Location: [facility];  Service: Gastroenterology    DILATATION AND CURETTAGE  2000    HYSTERECTOMY       Family History   Problem Relation Age of Onset    Breast cancer Maternal Aunt     Colon polyps Maternal Aunt     Colon polyps Sister     COPD Sister         Passed away 7-    Hodgkin's lymphoma Mother     Cancer Mother         Hodgkin’s disease    Heart failure Father     Colon cancer Neg Hx          Objective:  Vitals:    04/02/24 1050   BP: 111/75   Pulse: 92         04/02/24  1050   Weight: 68 kg (150  lb)     Body mass index is 24.96 kg/m².        Ortho Exam   AP lateral sunrise view of the knee shows only possibly some scalloping of the patella on the lateral but no acute changes noted no prior x-rays available.  She is alert and oriented x 3.  Head is normocephalic and sclerae clear.  The left knee shows no swelling effusion erythema is cool to touch.  She has 0 to 120 degrees of motion with no instability in flexion extension nor any varus or valgus instability at 10 to 30 degrees.  He does have a very small popliteal cyst noted.  There is no real patellofemoral crepitus noted quad hamstring function 5/5.  There is no joint line tenderness and negative Anupam's.  Calf is nontender.  She is tolerate anti-inflammatories in the past which is not currently taking any medication.    Assessment:        1. Left knee pain, unspecified chronicity    2. Chondromalacia of left patella           Plan: Reviewed at length with the patient her x-rays history and physical exam.  Believe she is developing some mild patellofemoral chondromalacia causing her symptoms.  After reviewing treatment options I will start her on a 6-day course of steroids followed by meloxicam 7.5 daily.  Return to see me in 2 weeks.  Answered all questions            Work Status:    BENITEZ query complete.    Orders:  Orders Placed This Encounter   Procedures    XR Knee 3+ View With Pacolet Left       Medications:  New Medications Ordered This Visit   Medications    methylPREDNISolone (MEDROL) 4 MG dose pack     Sig: Use as directed by package instructions-begin meloxicam after completion of the steroid pack     Dispense:  21 tablet     Refill:  0    meloxicam (MOBIC) 7.5 MG tablet     Sig: Take 1 tablet by mouth Daily.     Dispense:  30 tablet     Refill:  5       Followup:  Return in about 2 weeks (around 4/16/2024).          Dictated utilizing Dragon dictation

## 2024-04-05 ENCOUNTER — PATIENT ROUNDING (BHMG ONLY) (OUTPATIENT)
Dept: ORTHOPEDIC SURGERY | Facility: CLINIC | Age: 54
End: 2024-04-05
Payer: COMMERCIAL

## 2024-04-05 NOTE — PROGRESS NOTES
A My-Chart message has been sent to the patient for PATIENT ROUNDING with [facility].

## 2024-04-25 ENCOUNTER — OFFICE VISIT (OUTPATIENT)
Dept: ORTHOPEDIC SURGERY | Facility: CLINIC | Age: 54
End: 2024-04-25
Payer: COMMERCIAL

## 2024-04-25 VITALS — WEIGHT: 150 LBS | HEIGHT: 65 IN | BODY MASS INDEX: 24.99 KG/M2

## 2024-04-25 DIAGNOSIS — M22.42 CHONDROMALACIA OF LEFT PATELLA: Primary | ICD-10-CM

## 2024-04-25 PROCEDURE — 99213 OFFICE O/P EST LOW 20 MIN: CPT | Performed by: ORTHOPAEDIC SURGERY

## 2024-04-25 NOTE — PROGRESS NOTES
Subjective: Chondromalacia left knee     Patient ID: Angi HAM Walker is a 54 y.o. female.    Chief Complaint:    History of Present Illness 54-year-old female is seen in follow-up regarding the left knee.  Was seen at her last visit was placed on steroids followed by meloxicam and she notes significant reduction in her pain and discomfort.  Reports that now at worst is 1 out of 10.  Is tolerating the meloxicam.       Social History     Occupational History    Not on file   Tobacco Use    Smoking status: Every Day     Current packs/day: 0.50     Average packs/day: 0.5 packs/day for 10.0 years (5.0 ttl pk-yrs)     Types: Cigarettes     Passive exposure: Current    Smokeless tobacco: Never   Vaping Use    Vaping status: Never Used   Substance and Sexual Activity    Alcohol use: Yes     Comment: rare use    Drug use: No    Sexual activity: Yes     Partners: Male     Birth control/protection: Vasectomy      Review of Systems      Past Medical History:   Diagnosis Date    Diverticulosis     Hypothyroidism     Melanoma 2011    left lower leg    PONV (postoperative nausea and vomiting)     Presence of pessary     Scoliosis 2000     Past Surgical History:   Procedure Laterality Date    COLONOSCOPY N/A 07/31/2019    Procedure: COLONOSCOPY WITH BIOPSIES;  Surgeon: Constance Rehman MD;  Location: [facility];  Service: Gastroenterology    DILATATION AND CURETTAGE  2000    HYSTERECTOMY       Family History   Problem Relation Age of Onset    Breast cancer Maternal Aunt     Colon polyps Maternal Aunt     Colon polyps Sister     COPD Sister         Passed away 7-    Hodgkin's lymphoma Mother     Cancer Mother         Hodgkin’s disease    Heart failure Father     Colon cancer Neg Hx          Objective:  There were no vitals filed for this visit.      04/25/24  1507   Weight: 68 kg (150 lb)     Body mass index is 24.96 kg/m².        Ortho Exam   she is alert and oriented x 3.  The knee shows no swelling or erythema.  She still  does have a Baker's cyst posteriorly but there is no pain.  She has 0 to 130 degrees of motion with minimal patellofemoral crepitus.  Quad hamstring function is 5/5.  There is some tenderness along the MCL but there is no instability to varus valgus stressing and there is no AP instability.  No joint line tenderness in the calf is nontender.  Good distal pulses.  She is tolerating meloxicam.    Assessment:        1. Chondromalacia of left patella           Plan: She still having pain although minimal synovitis can continue meloxicam till she is pain-free.  Return to see me if the pain returns despite taking meloxicam.  Patient was in agreement.  Answered all questions            Work Status:    BENITEZ query complete.    Orders:  No orders of the defined types were placed in this encounter.      Medications:  No orders of the defined types were placed in this encounter.      Followup:  Return if symptoms worsen or fail to improve.          Dictated utilizing Dragon dictation

## 2024-07-16 ENCOUNTER — TRANSCRIBE ORDERS (OUTPATIENT)
Dept: ADMINISTRATIVE | Facility: HOSPITAL | Age: 54
End: 2024-07-16
Payer: COMMERCIAL

## 2024-07-16 DIAGNOSIS — Z13.9 SCREENING DUE: Primary | ICD-10-CM

## 2024-07-24 ENCOUNTER — PATIENT MESSAGE (OUTPATIENT)
Dept: FAMILY MEDICINE CLINIC | Facility: CLINIC | Age: 54
End: 2024-07-24
Payer: COMMERCIAL

## 2024-07-24 NOTE — TELEPHONE ENCOUNTER
From: Angi Fuentes  To: Marquez Suggs  Sent: 7/24/2024 2:28 PM EDT  Subject: Sinus infection    Dr Suggs, this is Florence Fuentes, I am getting blood and green out of my sinuses with a headache, can you please call me in a prescription? Thank you!

## 2024-07-25 ENCOUNTER — OFFICE VISIT (OUTPATIENT)
Dept: FAMILY MEDICINE CLINIC | Facility: CLINIC | Age: 54
End: 2024-07-25
Payer: COMMERCIAL

## 2024-07-25 ENCOUNTER — TELEPHONE (OUTPATIENT)
Dept: FAMILY MEDICINE CLINIC | Facility: CLINIC | Age: 54
End: 2024-07-25

## 2024-07-25 VITALS
HEART RATE: 89 BPM | RESPIRATION RATE: 16 BRPM | BODY MASS INDEX: 24.99 KG/M2 | WEIGHT: 150 LBS | SYSTOLIC BLOOD PRESSURE: 102 MMHG | OXYGEN SATURATION: 94 % | TEMPERATURE: 98.2 F | DIASTOLIC BLOOD PRESSURE: 68 MMHG | HEIGHT: 65 IN

## 2024-07-25 DIAGNOSIS — J01.90 ACUTE NON-RECURRENT SINUSITIS, UNSPECIFIED LOCATION: ICD-10-CM

## 2024-07-25 DIAGNOSIS — R05.9 COUGH, UNSPECIFIED TYPE: Primary | ICD-10-CM

## 2024-07-25 PROCEDURE — 99213 OFFICE O/P EST LOW 20 MIN: CPT | Performed by: INTERNAL MEDICINE

## 2024-07-25 PROCEDURE — 87428 SARSCOV & INF VIR A&B AG IA: CPT | Performed by: INTERNAL MEDICINE

## 2024-07-25 RX ORDER — AZITHROMYCIN 250 MG/1
TABLET, FILM COATED ORAL
Qty: 6 TABLET | Refills: 0 | Status: SHIPPED | OUTPATIENT
Start: 2024-07-25

## 2024-07-25 NOTE — PROGRESS NOTES
Subjective   Angi Fuentes is a 54 y.o. female.     Chief Complaint   Patient presents with    Sinusitis    Cough    Headache       Sinusitis  Associated symptoms include coughing, headaches and sinus pressure. Pertinent negatives include no shortness of breath.   Cough  Associated symptoms include headaches. Pertinent negatives include no shortness of breath or wheezing.   Headache     Patient seen today complaining of sinus pressure, cough, congestion, yellow-green sinus drainage at times with blood.  Symptoms going on for 2 weeks.  Last time she took antibiotics was in April.  Per patient only gets infection 2-3 times a year.  She gets better in between.  No fever chills or shortness of breath or chest pain.  The following portions of the patient's history were reviewed and updated as appropriate: allergies, current medications, past family history, past medical history, past social history, past surgical history and problem list.    Review of Systems   HENT:  Positive for sinus pressure.    Respiratory:  Positive for cough. Negative for apnea, choking, chest tightness, shortness of breath, wheezing and stridor.    Gastrointestinal: Negative.        Allergies   Allergen Reactions    Levofloxacin Nausea Only     12/20/19: She states she had some leftover Levaquin that she took 2 doses of. She states she gets mild GI side effects to Levaquin if she takes it without food, but otherwise tolerates it well.        Current Outpatient Medications on File Prior to Visit   Medication Sig Dispense Refill    levothyroxine sodium (TIROSINT) 100 MCG capsule Take 1 capsule by mouth Daily. Patient says that she is taking 100mcg M-Saturday and Sunday she takes a half of a pill      meloxicam (MOBIC) 7.5 MG tablet Take 1 tablet by mouth Daily. 30 tablet 5    methylPREDNISolone (MEDROL) 4 MG dose pack Use as directed by package instructions-begin meloxicam after completion of the steroid pack 21 tablet 0     No current  "facility-administered medications on file prior to visit.       Family History   Problem Relation Age of Onset    Breast cancer Maternal Aunt     Colon polyps Maternal Aunt     Colon polyps Sister     COPD Sister         Passed away 7-    Hodgkin's lymphoma Mother     Cancer Mother         Hodgkin’s disease    Heart failure Father     Colon cancer Neg Hx        Past Medical History:   Diagnosis Date    Diverticulosis     Hypothyroidism     Melanoma 2011    left lower leg    PONV (postoperative nausea and vomiting)     Presence of pessary     Scoliosis 2000       Past Surgical History:   Procedure Laterality Date    COLONOSCOPY N/A 07/31/2019    Procedure: COLONOSCOPY WITH BIOPSIES;  Surgeon: Constance Rehman MD;  Location: [facility];  Service: Gastroenterology    DILATATION AND CURETTAGE  2000    HYSTERECTOMY         Social History     Socioeconomic History    Marital status:    Tobacco Use    Smoking status: Every Day     Current packs/day: 0.50     Average packs/day: 0.5 packs/day for 10.0 years (5.0 ttl pk-yrs)     Types: Cigarettes     Passive exposure: Current    Smokeless tobacco: Never   Vaping Use    Vaping status: Never Used   Substance and Sexual Activity    Alcohol use: Yes     Comment: rare use    Drug use: No    Sexual activity: Yes     Partners: Male     Birth control/protection: Vasectomy       Patient Active Problem List   Diagnosis    Melanoma    Hypothyroidism    Hyperlipidemia    Colon cancer screening    Diverticulosis       /68   Pulse 89   Temp 98.2 °F (36.8 °C)   Resp 16   Ht 165.1 cm (65\")   Wt 68 kg (150 lb)   LMP  (LMP Unknown) Comment: pt has 1 overie  SpO2 94%   BMI 24.96 kg/m²   Body mass index is 24.96 kg/m².    Objective   Physical Exam  Vitals and nursing note reviewed.   Constitutional:       Appearance: She is well-developed.   HENT:      Mouth/Throat:      Mouth: Mucous membranes are moist.      Pharynx: Oropharynx is clear.   Eyes:      Extraocular " Movements: Extraocular movements intact.      Pupils: Pupils are equal, round, and reactive to light.   Neck:      Thyroid: No thyromegaly.      Vascular: No JVD.      Trachea: No tracheal deviation.   Cardiovascular:      Rate and Rhythm: Normal rate and regular rhythm.      Heart sounds: No murmur heard.  Pulmonary:      Effort: Pulmonary effort is normal. No respiratory distress.      Breath sounds: Normal breath sounds. No wheezing.   Musculoskeletal:      Cervical back: Normal range of motion and neck supple.   Lymphadenopathy:      Cervical: No cervical adenopathy.   Neurological:      Mental Status: She is alert and oriented to person, place, and time.           Assessment & Plan   Diagnoses and all orders for this visit:    1. Cough, unspecified type (Primary)  -     POCT SARS-CoV-2 Antigen DUNG + Flu    2. Acute non-recurrent sinusitis, unspecified location    Other orders  -     azithromycin (ZITHROMAX) 250 MG tablet; Take 2 tablets the first day, then 1 tablet daily for 4 days.  Dispense: 6 tablet; Refill: 0    Over-the-counter medication.  Check temperature.  If worse return.  Otherwise keep regular appointment.  Discussed with patient she is getting sinus infection frequently will recommend getting allergy testing done.  Patient reports she is only getting 2-3 a year.  EHR dragon/transcription disclaimer:  Part of this note are created by electronic transcription/translation of spoken language to printed text and thus may lead to erroneous, or at times, nonsensical words or phrases inadvertently transcribed.  Although I have reviewed for such errors, some may still exist.

## 2024-07-25 NOTE — TELEPHONE ENCOUNTER
"Caller: Angi Fuentes \"Florence\"    Relationship to patient: Self    Patient is needing: PATIENT WANTS TO LET DR. PHAM KNOW THAT HER SON WENT TO URGENT CARE TODAY AND HE WAS POSITIVE FOR COVID.      SHE IS ASKING TO LET HER KNOW IF DR. PHAM WANTS TO CHANGE HER MEDICATIONS HE PRESCRIBED.    "

## 2024-07-29 ENCOUNTER — PATIENT ROUNDING (BHMG ONLY) (OUTPATIENT)
Dept: URGENT CARE | Facility: CLINIC | Age: 54
End: 2024-07-29
Payer: COMMERCIAL

## 2024-07-29 NOTE — TELEPHONE ENCOUNTER
Patient informed, states she tested positive for Covid yesterday,  she has finished abx. Still not feeling good.

## 2024-07-29 NOTE — ED NOTES
Thank you for letting us care for you in your recent visit to our urgent care center. We would love to hear about your experience with us. Was this the first time you have visited our location?    We’re always looking for ways to make our patients’ experiences even better. Do you have any recommendations on ways we may improve?     I appreciate you taking the time to respond. Please be on the lookout for a survey about your recent visit from [facility] via text or email. We would greatly appreciate if you could fill that out and turn it back in. We want your voice to be heard and we value your feedback.   Thank you for choosing [facility] for your healthcare needs.

## 2024-08-16 ENCOUNTER — HOSPITAL ENCOUNTER (OUTPATIENT)
Dept: MAMMOGRAPHY | Facility: HOSPITAL | Age: 54
Discharge: HOME | End: 2024-08-16
Admitting: INTERNAL MEDICINE
Payer: COMMERCIAL

## 2024-08-16 DIAGNOSIS — Z13.9 SCREENING DUE: ICD-10-CM

## 2024-08-16 PROCEDURE — 77067 SCR MAMMO BI INCL CAD: CPT

## 2024-08-16 PROCEDURE — 77063 BREAST TOMOSYNTHESIS BI: CPT

## 2024-08-19 ENCOUNTER — HOSPITAL ENCOUNTER (OUTPATIENT)
Dept: GENERAL RADIOLOGY | Facility: HOSPITAL | Age: 54
Discharge: HOME | End: 2024-08-19
Admitting: INTERNAL MEDICINE
Payer: COMMERCIAL

## 2024-08-19 ENCOUNTER — OFFICE VISIT (OUTPATIENT)
Dept: FAMILY MEDICINE CLINIC | Facility: CLINIC | Age: 54
End: 2024-08-19
Payer: COMMERCIAL

## 2024-08-19 VITALS
RESPIRATION RATE: 16 BRPM | HEIGHT: 64 IN | OXYGEN SATURATION: 95 % | WEIGHT: 148 LBS | BODY MASS INDEX: 25.27 KG/M2 | TEMPERATURE: 98.9 F | DIASTOLIC BLOOD PRESSURE: 72 MMHG | SYSTOLIC BLOOD PRESSURE: 108 MMHG | HEART RATE: 89 BPM

## 2024-08-19 DIAGNOSIS — E03.9 HYPOTHYROIDISM, UNSPECIFIED TYPE: ICD-10-CM

## 2024-08-19 DIAGNOSIS — R05.1 ACUTE COUGH: ICD-10-CM

## 2024-08-19 DIAGNOSIS — E78.2 MIXED HYPERLIPIDEMIA: Primary | ICD-10-CM

## 2024-08-19 PROCEDURE — 71046 X-RAY EXAM CHEST 2 VIEWS: CPT

## 2024-08-19 PROCEDURE — 99213 OFFICE O/P EST LOW 20 MIN: CPT | Performed by: INTERNAL MEDICINE

## 2024-08-19 PROCEDURE — 36415 COLL VENOUS BLD VENIPUNCTURE: CPT | Performed by: INTERNAL MEDICINE

## 2024-08-19 RX ORDER — ALBUTEROL SULFATE 90 UG/1
2 AEROSOL, METERED RESPIRATORY (INHALATION) EVERY 6 HOURS PRN
Qty: 90 G | Refills: 1 | Status: SHIPPED | OUTPATIENT
Start: 2024-08-19

## 2024-08-19 NOTE — PROGRESS NOTES
Mason Fuentes is a 54 y.o. female.     Chief Complaint   Patient presents with    Hypothyroidism   Hyperlipidemia.    Hypothyroidism  Associated symptoms include coughing. Pertinent negatives include no chest pain, fatigue or fever.      Patient seen follow-up for hyperlipidemia.  She also wants thyroid checked follows with endocrinologist.  Recently had mammogram.  Continues to have cough, especially in the morning.  Patient is a smoker.  She was diagnosed with COVID 3 weeks ago in the urgent care.  Has finished course of Zithromax.  The following portions of the patient's history were reviewed and updated as appropriate: allergies, current medications, past family history, past medical history, past social history, past surgical history and problem list.    Review of Systems   Constitutional:  Negative for activity change, appetite change, fatigue and fever.   Eyes:  Negative for blurred vision and double vision.   Respiratory:  Positive for cough. Negative for apnea, choking, chest tightness, shortness of breath, wheezing and stridor.    Cardiovascular:  Negative for chest pain, palpitations and leg swelling.   Gastrointestinal: Negative.    Neurological:  Negative for dizziness, syncope, light-headedness and headache.       Allergies   Allergen Reactions    Levofloxacin Nausea Only     12/20/19: She states she had some leftover Levaquin that she took 2 doses of. She states she gets mild GI side effects to Levaquin if she takes it without food, but otherwise tolerates it well.        Current Outpatient Medications on File Prior to Visit   Medication Sig Dispense Refill    levothyroxine sodium (TIROSINT) 100 MCG capsule Take 1 capsule by mouth Daily. Patient says that she is taking 100mcg M-Saturday and Sunday she takes a half of a pill      azithromycin (ZITHROMAX) 250 MG tablet Take 2 tablets the first day, then 1 tablet daily for 4 days. 6 tablet 0    meloxicam (MOBIC) 7.5 MG tablet Take 1  "tablet by mouth Daily. 30 tablet 5    methylPREDNISolone (MEDROL) 4 MG dose pack Use as directed by package instructions-begin meloxicam after completion of the steroid pack 21 tablet 0     No current facility-administered medications on file prior to visit.       Family History   Problem Relation Age of Onset    Breast cancer Maternal Aunt     Colon polyps Maternal Aunt     Colon polyps Sister     COPD Sister         Passed away 7-    Hodgkin's lymphoma Mother     Cancer Mother         Hodgkin’s disease    Heart failure Father     Colon cancer Neg Hx        Past Medical History:   Diagnosis Date    Diverticulosis     Hypothyroidism     Melanoma 2011    left lower leg    PONV (postoperative nausea and vomiting)     Presence of pessary     Scoliosis 2000       Past Surgical History:   Procedure Laterality Date    COLONOSCOPY N/A 07/31/2019    Procedure: COLONOSCOPY WITH BIOPSIES;  Surgeon: Constance Rehman MD;  Location: [facility];  Service: Gastroenterology    DILATATION AND CURETTAGE  2000    HYSTERECTOMY         Social History     Socioeconomic History    Marital status:    Tobacco Use    Smoking status: Every Day     Current packs/day: 0.50     Average packs/day: 0.5 packs/day for 10.0 years (5.0 ttl pk-yrs)     Types: Cigarettes     Passive exposure: Current    Smokeless tobacco: Never   Vaping Use    Vaping status: Never Used   Substance and Sexual Activity    Alcohol use: Yes     Comment: rare use    Drug use: No    Sexual activity: Yes     Partners: Male     Birth control/protection: Vasectomy       Patient Active Problem List   Diagnosis    Melanoma    Hypothyroidism    Hyperlipidemia    Colon cancer screening    Diverticulosis       /72   Pulse 89   Temp 98.9 °F (37.2 °C)   Resp 16   Ht 163.8 cm (64.49\")   Wt 67.1 kg (148 lb)   LMP  (LMP Unknown) Comment: pt has 1 overie  SpO2 95%   BMI 25.02 kg/m²   Body mass index is 25.02 kg/m².    Objective   Physical Exam  Constitutional:     "   Appearance: She is well-developed.   Eyes:      Pupils: Pupils are equal, round, and reactive to light.   Neck:      Thyroid: No thyromegaly.      Vascular: No JVD.      Trachea: No tracheal deviation.   Cardiovascular:      Rate and Rhythm: Normal rate and regular rhythm.      Heart sounds: No murmur heard.  Pulmonary:      Effort: Pulmonary effort is normal. No respiratory distress.      Breath sounds: Normal breath sounds. No stridor. No wheezing, rhonchi or rales.   Chest:      Chest wall: No tenderness.   Abdominal:      General: Bowel sounds are normal. There is no distension.      Palpations: Abdomen is soft. There is no mass.      Tenderness: There is no abdominal tenderness. There is no guarding or rebound.      Hernia: No hernia is present.   Musculoskeletal:      Cervical back: Normal range of motion and neck supple.   Lymphadenopathy:      Cervical: No cervical adenopathy.   Neurological:      Mental Status: She is alert and oriented to person, place, and time.           Assessment & Plan   Diagnoses and all orders for this visit:    1. Mixed hyperlipidemia (Primary)  -     CBC & Differential  -     Comprehensive Metabolic Panel  -     Lipid Panel With / Chol / HDL Ratio  -     TSH  -     T4, Free    2. Hypothyroidism, unspecified type  -     CBC & Differential  -     Comprehensive Metabolic Panel  -     Lipid Panel With / Chol / HDL Ratio  -     TSH  -     T4, Free    3. Acute cough  -     XR Chest 2 View    Other orders  -     albuterol sulfate  (90 Base) MCG/ACT inhaler; Inhale 2 puffs Every 6 (Six) Hours As Needed for Wheezing.  Dispense: 90 g; Refill: 1    Continue Synthroid.  Continue to follow-up with the endocrinologist.  I am not managing her Synthroid.  Continue diet and exercise.  Reminded patient of colonoscopy.  Her cough continues to have sent a prescription for albuterol inhaler, chest x-ray.  Discussed with patient stopping smoking.  I will see her back in 6 months time, unless  she is not better.  EHR dragon/transcription disclaimer:  Part of this note are created by electronic transcription/translation of spoken language to printed text and thus may lead to erroneous, or at times, nonsensical words or phrases inadvertently transcribed.  Although I have reviewed for such errors, some may still exist.

## 2024-08-19 NOTE — PROGRESS NOTES
Venipuncture Blood Specimen Collection  Venipuncture performed in left arm by Nohemy Hargrove MA with good hemostasis. Patient tolerated the procedure well without complications.   08/19/24   Nohemy Hargrove MA

## 2024-08-20 DIAGNOSIS — D75.1 ERYTHROCYTOSIS: Primary | ICD-10-CM

## 2024-08-20 LAB
ALBUMIN SERPL-MCNC: 4.5 G/DL (ref 3.8–4.9)
ALP SERPL-CCNC: 80 IU/L (ref 44–121)
ALT SERPL-CCNC: 11 IU/L (ref 0–32)
AST SERPL-CCNC: 18 IU/L (ref 0–40)
BASOPHILS # BLD AUTO: 0 X10E3/UL (ref 0–0.2)
BASOPHILS NFR BLD AUTO: 1 %
BILIRUB SERPL-MCNC: 0.5 MG/DL (ref 0–1.2)
BUN SERPL-MCNC: 13 MG/DL (ref 6–24)
BUN/CREAT SERPL: 14 (ref 9–23)
CALCIUM SERPL-MCNC: 9.8 MG/DL (ref 8.7–10.2)
CHLORIDE SERPL-SCNC: 104 MMOL/L (ref 96–106)
CHOLEST SERPL-MCNC: 205 MG/DL (ref 100–199)
CHOLEST/HDLC SERPL: 3.4 RATIO (ref 0–4.4)
CO2 SERPL-SCNC: 24 MMOL/L (ref 20–29)
CREAT SERPL-MCNC: 0.9 MG/DL (ref 0.57–1)
EGFRCR SERPLBLD CKD-EPI 2021: 75 ML/MIN/1.73
EOSINOPHIL # BLD AUTO: 0.1 X10E3/UL (ref 0–0.4)
EOSINOPHIL NFR BLD AUTO: 2 %
ERYTHROCYTE [DISTWIDTH] IN BLOOD BY AUTOMATED COUNT: 12.8 % (ref 11.7–15.4)
GLOBULIN SER CALC-MCNC: 2.9 G/DL (ref 1.5–4.5)
GLUCOSE SERPL-MCNC: 85 MG/DL (ref 70–99)
HCT VFR BLD AUTO: 50.7 % (ref 34–46.6)
HDLC SERPL-MCNC: 60 MG/DL
HGB BLD-MCNC: 16.2 G/DL (ref 11.1–15.9)
IMM GRANULOCYTES # BLD AUTO: 0 X10E3/UL (ref 0–0.1)
IMM GRANULOCYTES NFR BLD AUTO: 0 %
LDLC SERPL CALC-MCNC: 125 MG/DL (ref 0–99)
LYMPHOCYTES # BLD AUTO: 2 X10E3/UL (ref 0.7–3.1)
LYMPHOCYTES NFR BLD AUTO: 31 %
MCH RBC QN AUTO: 30.3 PG (ref 26.6–33)
MCHC RBC AUTO-ENTMCNC: 32 G/DL (ref 31.5–35.7)
MCV RBC AUTO: 95 FL (ref 79–97)
MONOCYTES # BLD AUTO: 0.5 X10E3/UL (ref 0.1–0.9)
MONOCYTES NFR BLD AUTO: 7 %
NEUTROPHILS # BLD AUTO: 3.7 X10E3/UL (ref 1.4–7)
NEUTROPHILS NFR BLD AUTO: 59 %
PLATELET # BLD AUTO: 202 X10E3/UL (ref 150–450)
POTASSIUM SERPL-SCNC: 4.8 MMOL/L (ref 3.5–5.2)
PROT SERPL-MCNC: 7.4 G/DL (ref 6–8.5)
RBC # BLD AUTO: 5.35 X10E6/UL (ref 3.77–5.28)
SODIUM SERPL-SCNC: 142 MMOL/L (ref 134–144)
T4 FREE SERPL-MCNC: 1.71 NG/DL (ref 0.82–1.77)
TRIGL SERPL-MCNC: 115 MG/DL (ref 0–149)
TSH SERPL DL<=0.005 MIU/L-ACNC: 0.41 UIU/ML (ref 0.45–4.5)
VLDLC SERPL CALC-MCNC: 20 MG/DL (ref 5–40)
WBC # BLD AUTO: 6.3 X10E3/UL (ref 3.4–10.8)

## 2024-09-03 ENCOUNTER — TELEPHONE (OUTPATIENT)
Dept: FAMILY MEDICINE CLINIC | Facility: CLINIC | Age: 54
End: 2024-09-03
Payer: COMMERCIAL

## 2024-09-03 NOTE — TELEPHONE ENCOUNTER
"Caller: Angi Fuentes \"Florence\"    Relationship: Self    Best call back number: [phone] (Mobile)     What form or medical record are you requesting: RECENT LAB RESULTS    Who is requesting this form or medical record from you: PATIENT    How would you like to receive the form or medical records (pick-up, mail, fax): FAX  If fax, what is the fax number: [phone]      Timeframe paperwork needed: ASAP BEFORE THURSDAY SHE HAS AN APPOINTMENT ON THURSDAY OF THIS WEEK.    Additional notes: PLEASE FAX ORDER OVER TO DR. CHAUDHARY          THANKS   "

## 2024-09-04 ENCOUNTER — TRANSCRIBE ORDERS (OUTPATIENT)
Dept: ADMINISTRATIVE | Facility: HOSPITAL | Age: 54
End: 2024-09-04
Payer: COMMERCIAL

## 2024-09-04 ENCOUNTER — LAB (OUTPATIENT)
Dept: LAB | Facility: HOSPITAL | Age: 54
End: 2024-09-04
Payer: COMMERCIAL

## 2024-09-04 DIAGNOSIS — E03.8 SECONDARY HYPOTHYROIDISM: ICD-10-CM

## 2024-09-04 DIAGNOSIS — E03.8 SECONDARY HYPOTHYROIDISM: Primary | ICD-10-CM

## 2024-09-04 DIAGNOSIS — E08.311: ICD-10-CM

## 2024-09-04 DIAGNOSIS — R03.0 TRANSIENT HYPERTENSION: ICD-10-CM

## 2024-09-04 LAB
T3FREE SERPL-MCNC: 2.97 PG/ML (ref 2–4.4)
T4 FREE SERPL-MCNC: 1.55 NG/DL (ref 0.92–1.68)
T4 SERPL-MCNC: 8.54 MCG/DL (ref 4.5–11.7)
TSH SERPL DL<=0.05 MIU/L-ACNC: 0.3 UIU/ML (ref 0.27–4.2)

## 2024-09-04 PROCEDURE — 36415 COLL VENOUS BLD VENIPUNCTURE: CPT

## 2024-09-04 PROCEDURE — 84481 FREE ASSAY (FT-3): CPT

## 2024-09-04 PROCEDURE — 84439 ASSAY OF FREE THYROXINE: CPT

## 2024-09-04 PROCEDURE — 84443 ASSAY THYROID STIM HORMONE: CPT

## 2024-09-04 PROCEDURE — 86376 MICROSOMAL ANTIBODY EACH: CPT

## 2024-09-05 LAB — THYROPEROXIDASE AB SERPL-ACNC: 356 IU/ML (ref 0–34)

## 2024-09-24 ENCOUNTER — LAB (OUTPATIENT)
Dept: LAB | Facility: HOSPITAL | Age: 54
End: 2024-09-24
Payer: COMMERCIAL

## 2024-09-24 ENCOUNTER — CONSULT (OUTPATIENT)
Dept: ONCOLOGY | Facility: CLINIC | Age: 54
End: 2024-09-24
Payer: COMMERCIAL

## 2024-09-24 VITALS
SYSTOLIC BLOOD PRESSURE: 111 MMHG | OXYGEN SATURATION: 100 % | RESPIRATION RATE: 16 BRPM | DIASTOLIC BLOOD PRESSURE: 76 MMHG | BODY MASS INDEX: 25.53 KG/M2 | TEMPERATURE: 98.2 F | WEIGHT: 153.22 LBS | HEIGHT: 65 IN | HEART RATE: 78 BPM

## 2024-09-24 DIAGNOSIS — D75.1 SECONDARY POLYCYTHEMIA: Primary | ICD-10-CM

## 2024-09-24 DIAGNOSIS — D75.1 ERYTHROCYTOSIS: Primary | ICD-10-CM

## 2024-09-24 LAB
BASOPHILS # BLD AUTO: 0.05 10*3/MM3 (ref 0–0.2)
BASOPHILS NFR BLD AUTO: 0.9 % (ref 0–1.5)
DEPRECATED RDW RBC AUTO: 47.8 FL (ref 37–54)
EOSINOPHIL # BLD AUTO: 0.09 10*3/MM3 (ref 0–0.4)
EOSINOPHIL NFR BLD AUTO: 1.5 % (ref 0.3–6.2)
ERYTHROCYTE [DISTWIDTH] IN BLOOD BY AUTOMATED COUNT: 13.4 % (ref 12.3–15.4)
HCT VFR BLD AUTO: 48.2 % (ref 34–46.6)
HGB BLD-MCNC: 15.8 G/DL (ref 12–15.9)
IMM GRANULOCYTES # BLD AUTO: 0.01 10*3/MM3 (ref 0–0.05)
IMM GRANULOCYTES NFR BLD AUTO: 0.2 % (ref 0–0.5)
LYMPHOCYTES # BLD AUTO: 1.89 10*3/MM3 (ref 0.7–3.1)
LYMPHOCYTES NFR BLD AUTO: 32.5 % (ref 19.6–45.3)
MCH RBC QN AUTO: 30.9 PG (ref 26.6–33)
MCHC RBC AUTO-ENTMCNC: 32.8 G/DL (ref 31.5–35.7)
MCV RBC AUTO: 94.3 FL (ref 79–97)
MONOCYTES # BLD AUTO: 0.36 10*3/MM3 (ref 0.1–0.9)
MONOCYTES NFR BLD AUTO: 6.2 % (ref 5–12)
NEUTROPHILS NFR BLD AUTO: 3.41 10*3/MM3 (ref 1.7–7)
NEUTROPHILS NFR BLD AUTO: 58.7 % (ref 42.7–76)
PLATELET # BLD AUTO: 235 10*3/MM3 (ref 140–450)
PMV BLD AUTO: 10.4 FL (ref 6–12)
RBC # BLD AUTO: 5.11 10*6/MM3 (ref 3.77–5.28)
WBC NRBC COR # BLD AUTO: 5.81 10*3/MM3 (ref 3.4–10.8)

## 2024-09-24 PROCEDURE — 99244 OFF/OP CNSLTJ NEW/EST MOD 40: CPT | Performed by: INTERNAL MEDICINE

## 2024-09-24 PROCEDURE — 85025 COMPLETE CBC W/AUTO DIFF WBC: CPT | Performed by: INTERNAL MEDICINE

## 2024-09-24 PROCEDURE — 36415 COLL VENOUS BLD VENIPUNCTURE: CPT

## 2024-09-24 PROCEDURE — 82668 ASSAY OF ERYTHROPOIETIN: CPT | Performed by: INTERNAL MEDICINE

## 2024-09-25 ENCOUNTER — PATIENT ROUNDING (BHMG ONLY) (OUTPATIENT)
Dept: ONCOLOGY | Facility: CLINIC | Age: 54
End: 2024-09-25
Payer: COMMERCIAL

## 2024-09-25 LAB — EPO SERPL-ACNC: 8.7 MIU/ML (ref 2.6–18.5)

## 2025-02-25 ENCOUNTER — APPOINTMENT (OUTPATIENT)
Dept: GENERAL RADIOLOGY | Facility: HOSPITAL | Age: 55
End: 2025-02-25
Payer: COMMERCIAL

## 2025-02-25 ENCOUNTER — HOSPITAL ENCOUNTER (EMERGENCY)
Facility: HOSPITAL | Age: 55
Discharge: HOME | End: 2025-02-25
Attending: EMERGENCY MEDICINE | Admitting: EMERGENCY MEDICINE
Payer: COMMERCIAL

## 2025-02-25 VITALS
WEIGHT: 152 LBS | DIASTOLIC BLOOD PRESSURE: 78 MMHG | OXYGEN SATURATION: 91 % | TEMPERATURE: 99 F | HEART RATE: 91 BPM | RESPIRATION RATE: 16 BRPM | BODY MASS INDEX: 25.95 KG/M2 | HEIGHT: 64 IN | SYSTOLIC BLOOD PRESSURE: 134 MMHG

## 2025-02-25 DIAGNOSIS — R05.1 ACUTE COUGH: Primary | ICD-10-CM

## 2025-02-25 PROCEDURE — 99283 EMERGENCY DEPT VISIT LOW MDM: CPT | Performed by: EMERGENCY MEDICINE

## 2025-02-25 PROCEDURE — 71046 X-RAY EXAM CHEST 2 VIEWS: CPT

## 2025-02-25 RX ORDER — PREDNISONE 20 MG/1
TABLET ORAL
Qty: 18 TABLET | Refills: 0 | Status: SHIPPED | OUTPATIENT
Start: 2025-02-25 | End: 2025-03-05

## 2025-02-25 RX ORDER — DEXTROMETHORPHAN HYDROBROMIDE AND PROMETHAZINE HYDROCHLORIDE 15; 6.25 MG/5ML; MG/5ML
5 SYRUP ORAL NIGHTLY
Qty: 118 ML | Refills: 0 | Status: SHIPPED | OUTPATIENT
Start: 2025-02-25

## 2025-02-25 RX ORDER — DOXYCYCLINE 100 MG/1
100 CAPSULE ORAL 2 TIMES DAILY
Qty: 14 CAPSULE | Refills: 0 | Status: SHIPPED | OUTPATIENT
Start: 2025-02-25 | End: 2025-03-04

## 2025-02-25 NOTE — ED PROVIDER NOTES
Subjective   History of Present Illness  Patient is a 54-year-old female with no lung disease.  She was diagnosed with influenza A on February 19 and says she still not better.  She was given steroids, Z-Kirit and albuterol.  She is using albuterol couple times.  She has completed the steroids and a Z-Kirit.  She is concerned she is getting bronchitis.  Says the mucus is still green.  She no longer is running fevers and is staying in the 99.  In the evening she has a hoarse voice and it feels sore from coughing does not feel she has an actual sore throat.  She is a smoker.      Review of Systems   Constitutional:  Positive for fever (Resolved).   HENT:  Positive for congestion, rhinorrhea and sore throat. Negative for ear pain.    Eyes: Negative.    Respiratory:  Positive for chest tightness. Negative for wheezing.    Cardiovascular:  Negative for chest pain.   Gastrointestinal: Negative.    All other systems reviewed and are negative.      Past Medical History:   Diagnosis Date    Diverticulosis     Hashimoto's thyroiditis     Hyperlipidemia     Hypothyroidism     Melanoma 2011    left lower leg    Melanoma in situ     PONV (postoperative nausea and vomiting)     Presence of pessary     Scoliosis 2000       Allergies   Allergen Reactions    Levofloxacin Nausea Only     12/20/19: She states she had some leftover Levaquin that she took 2 doses of. She states she gets mild GI side effects to Levaquin if she takes it without food, but otherwise tolerates it well.        Past Surgical History:   Procedure Laterality Date    COLONOSCOPY N/A 07/31/2019    Procedure: COLONOSCOPY WITH BIOPSIES;  Surgeon: Constance Rehman MD;  Location: [facility];  Service: Gastroenterology    DILATATION AND CURETTAGE  2000    HYSTERECTOMY  2013    OOPHORECTOMY Left 2013       Family History   Problem Relation Age of Onset    Hodgkin's lymphoma Mother     Cancer Mother         Hodgkin’s disease    Hypertension Father     Heart failure Father      Colon polyps Sister     COPD Sister         Passed away 7-    Lung cancer Sister     Anemia Sister     Breast cancer Maternal Aunt     Colon polyps Maternal Aunt     Hypertension Maternal Grandmother     Hypertension Maternal Grandfather     Hypertension Paternal Grandmother     Colon cancer Neg Hx        Social History     Socioeconomic History    Marital status:     Number of children: 2   Tobacco Use    Smoking status: Every Day     Current packs/day: 0.50     Average packs/day: 0.5 packs/day for 10.0 years (5.0 ttl pk-yrs)     Types: Cigarettes     Passive exposure: Current    Smokeless tobacco: Never   Vaping Use    Vaping status: Never Used   Substance and Sexual Activity    Alcohol use: Yes     Comment: rare use    Drug use: No    Sexual activity: Yes     Partners: Male     Birth control/protection: Vasectomy           Objective   Physical Exam  Constitutional:       Appearance: Normal appearance.   HENT:      Mouth/Throat:      Mouth: Mucous membranes are moist.      Pharynx: Posterior oropharyngeal erythema (Mild) present.   Eyes:      Conjunctiva/sclera: Conjunctivae normal.   Cardiovascular:      Rate and Rhythm: Normal rate and regular rhythm.   Pulmonary:      Effort: Pulmonary effort is normal.      Breath sounds: Normal breath sounds.   Abdominal:      Tenderness: There is no right CVA tenderness or left CVA tenderness.   Musculoskeletal:         General: Normal range of motion.      Cervical back: Neck supple.   Skin:     General: Skin is warm and dry.   Neurological:      Mental Status: She is alert.      Gait: Gait normal.   Psychiatric:         Mood and Affect: Mood normal.         Behavior: Behavior normal.         Procedures           ED Course                                                       Medical Decision Making  Did not reswab patient as she knows she has had influenza A.  Chest x-ray did not show pneumonia but did show emphysema which I discussed with patient.  Says she  going to quit smoking.  Her lungs sound clear and I do not appreciate any wheezing.  Vital stable.  Oxygen is 95% when I am in the room with the patient.  She is not having dyspnea I do not think patient needs to be admitted at this time.  I did we will treat her with doxycycline and we will extend her steroids.  She has family doctor for follow-up and return precautions..    Problems Addressed:  Acute cough: complicated acute illness or injury    Amount and/or Complexity of Data Reviewed  Radiology: ordered.    Risk  Prescription drug management.        Final diagnoses:   Acute cough       ED Disposition  ED Disposition       ED Disposition   Discharge    Condition   Stable    Comment   --               Marquez Suggs MD  [address] [place] [address]  [phone]    In 1 week           Medication List        New Prescriptions      doxycycline 100 MG capsule  Commonly known as: MONODOX  Take 1 capsule by mouth 2 (Two) Times a Day for 7 days.     promethazine-dextromethorphan 6.25-15 MG/5ML syrup  Commonly known as: PROMETHAZINE-DM  Take 5 mL by mouth Every Night. May repeat dose after 4 hours            Changed      albuterol sulfate  (90 Base) MCG/ACT inhaler  Commonly known as: PROVENTIL HFA;VENTOLIN HFA;PROAIR HFA  Inhale 2 puffs Every 4 (Four) Hours As Needed for Wheezing or Shortness of Air.  What changed: Another medication with the same name was removed. Continue taking this medication, and follow the directions you see here.     predniSONE 20 MG tablet  Commonly known as: DELTASONE  Take 3 tablets by mouth Daily for 3 days, THEN 2 tablets Daily for 3 days, THEN 1 tablet Daily for 3 days.  Start taking on: February 25, 2025  What changed: See the new instructions.            ASK your doctor about these medications      azithromycin 250 MG tablet  Commonly known as: ZITHROMAX  Take 2 tablets by mouth Daily for 1 day, THEN 1 tablet Daily for 4 days.  Start taking on: February 19, 2025  Ask  about: Should I take this medication?               Where to Get Your Medications        These medications were sent to [facility] [id number] - [place] - [address] - [phone]  - [phone] FX [address] [address]      Phone: [phone]   doxycycline 100 MG capsule  predniSONE 20 MG tablet  promethazine-dextromethorphan 6.25-15 MG/5ML syrup            Verenice Chavez PA-C  02/25/25 1149

## 2025-03-12 ENCOUNTER — OFFICE VISIT (OUTPATIENT)
Dept: FAMILY MEDICINE CLINIC | Facility: CLINIC | Age: 55
End: 2025-03-12
Payer: COMMERCIAL

## 2025-03-12 ENCOUNTER — LAB (OUTPATIENT)
Dept: LAB | Facility: HOSPITAL | Age: 55
End: 2025-03-12
Payer: COMMERCIAL

## 2025-03-12 VITALS
HEIGHT: 64 IN | HEART RATE: 92 BPM | OXYGEN SATURATION: 94 % | SYSTOLIC BLOOD PRESSURE: 110 MMHG | TEMPERATURE: 98.2 F | DIASTOLIC BLOOD PRESSURE: 72 MMHG | BODY MASS INDEX: 25.57 KG/M2 | WEIGHT: 149.8 LBS

## 2025-03-12 DIAGNOSIS — E03.9 HYPOTHYROIDISM, UNSPECIFIED TYPE: ICD-10-CM

## 2025-03-12 DIAGNOSIS — E78.2 MIXED HYPERLIPIDEMIA: Primary | ICD-10-CM

## 2025-03-12 LAB
ALBUMIN SERPL-MCNC: 4.3 G/DL (ref 3.5–5.2)
ALBUMIN/GLOB SERPL: 1.5 G/DL
ALP SERPL-CCNC: 77 U/L (ref 39–117)
ALT SERPL W P-5'-P-CCNC: 14 U/L (ref 1–33)
ANION GAP SERPL CALCULATED.3IONS-SCNC: 11 MMOL/L (ref 5–15)
AST SERPL-CCNC: 18 U/L (ref 1–32)
BASOPHILS # BLD AUTO: 0.06 10*3/MM3 (ref 0–0.2)
BASOPHILS NFR BLD AUTO: 0.7 % (ref 0–1.5)
BILIRUB SERPL-MCNC: 0.7 MG/DL (ref 0–1.2)
BUN SERPL-MCNC: 12 MG/DL (ref 6–20)
BUN/CREAT SERPL: 11.8 (ref 7–25)
CALCIUM SPEC-SCNC: 9.6 MG/DL (ref 8.6–10.5)
CHLORIDE SERPL-SCNC: 102 MMOL/L (ref 98–107)
CHOLEST SERPL-MCNC: 204 MG/DL (ref 0–200)
CO2 SERPL-SCNC: 26 MMOL/L (ref 22–29)
CREAT SERPL-MCNC: 1.02 MG/DL (ref 0.57–1)
DEPRECATED RDW RBC AUTO: 42.2 FL (ref 37–54)
EGFRCR SERPLBLD CKD-EPI 2021: 65.1 ML/MIN/1.73
EOSINOPHIL # BLD AUTO: 0.1 10*3/MM3 (ref 0–0.4)
EOSINOPHIL NFR BLD AUTO: 1.1 % (ref 0.3–6.2)
ERYTHROCYTE [DISTWIDTH] IN BLOOD BY AUTOMATED COUNT: 12.6 % (ref 12.3–15.4)
GLOBULIN UR ELPH-MCNC: 2.9 GM/DL
GLUCOSE SERPL-MCNC: 99 MG/DL (ref 65–99)
HCT VFR BLD AUTO: 46.4 % (ref 34–46.6)
HDLC SERPL QL: 2.87
HDLC SERPL-MCNC: 71 MG/DL (ref 40–60)
HGB BLD-MCNC: 15.5 G/DL (ref 12–15.9)
IMM GRANULOCYTES # BLD AUTO: 0.02 10*3/MM3 (ref 0–0.05)
IMM GRANULOCYTES NFR BLD AUTO: 0.2 % (ref 0–0.5)
LDLC SERPL CALC-MCNC: 114 MG/DL (ref 0–100)
LYMPHOCYTES # BLD AUTO: 1.88 10*3/MM3 (ref 0.7–3.1)
LYMPHOCYTES NFR BLD AUTO: 20.6 % (ref 19.6–45.3)
MCH RBC QN AUTO: 30.8 PG (ref 26.6–33)
MCHC RBC AUTO-ENTMCNC: 33.4 G/DL (ref 31.5–35.7)
MCV RBC AUTO: 92.2 FL (ref 79–97)
MONOCYTES # BLD AUTO: 0.51 10*3/MM3 (ref 0.1–0.9)
MONOCYTES NFR BLD AUTO: 5.6 % (ref 5–12)
NEUTROPHILS NFR BLD AUTO: 6.55 10*3/MM3 (ref 1.7–7)
NEUTROPHILS NFR BLD AUTO: 71.8 % (ref 42.7–76)
NRBC BLD AUTO-RTO: 0 /100 WBC (ref 0–0.2)
PLATELET # BLD AUTO: 283 10*3/MM3 (ref 140–450)
PMV BLD AUTO: 11.2 FL (ref 6–12)
POTASSIUM SERPL-SCNC: 4.5 MMOL/L (ref 3.5–5.2)
PROT SERPL-MCNC: 7.2 G/DL (ref 6–8.5)
RBC # BLD AUTO: 5.03 10*6/MM3 (ref 3.77–5.28)
SODIUM SERPL-SCNC: 139 MMOL/L (ref 136–145)
TRIGL SERPL-MCNC: 109 MG/DL (ref 0–150)
VLDLC SERPL-MCNC: 19 MG/DL (ref 5–40)
WBC NRBC COR # BLD AUTO: 9.12 10*3/MM3 (ref 3.4–10.8)

## 2025-03-12 PROCEDURE — 85025 COMPLETE CBC W/AUTO DIFF WBC: CPT | Performed by: INTERNAL MEDICINE

## 2025-03-12 PROCEDURE — 80053 COMPREHEN METABOLIC PANEL: CPT | Performed by: INTERNAL MEDICINE

## 2025-03-12 PROCEDURE — 36415 COLL VENOUS BLD VENIPUNCTURE: CPT | Performed by: INTERNAL MEDICINE

## 2025-03-12 PROCEDURE — 99213 OFFICE O/P EST LOW 20 MIN: CPT | Performed by: INTERNAL MEDICINE

## 2025-03-12 PROCEDURE — 80061 LIPID PANEL: CPT | Performed by: INTERNAL MEDICINE

## 2025-03-12 RX ORDER — GUAIFENESIN 600 MG/1
1200 TABLET, EXTENDED RELEASE ORAL 2 TIMES DAILY
COMMUNITY

## 2025-03-12 RX ORDER — LEVOFLOXACIN 500 MG/1
500 TABLET, FILM COATED ORAL DAILY
Qty: 7 TABLET | Refills: 0 | Status: SHIPPED | OUTPATIENT
Start: 2025-03-12

## 2025-04-03 ENCOUNTER — LAB (OUTPATIENT)
Dept: LAB | Facility: HOSPITAL | Age: 55
End: 2025-04-03
Payer: COMMERCIAL

## 2025-04-03 ENCOUNTER — TRANSCRIBE ORDERS (OUTPATIENT)
Dept: ADMINISTRATIVE | Facility: HOSPITAL | Age: 55
End: 2025-04-03
Payer: COMMERCIAL

## 2025-04-03 DIAGNOSIS — E03.9 HYPOTHYROIDISM, UNSPECIFIED TYPE: Primary | ICD-10-CM

## 2025-04-03 DIAGNOSIS — E03.9 HYPOTHYROIDISM, UNSPECIFIED TYPE: ICD-10-CM

## 2025-04-03 DIAGNOSIS — E06.3 CHRONIC LYMPHOCYTIC THYROIDITIS: ICD-10-CM

## 2025-04-03 LAB
T3FREE SERPL-MCNC: 3.17 PG/ML (ref 2–4.4)
T4 FREE SERPL-MCNC: 1.76 NG/DL (ref 0.92–1.68)
T4 SERPL-MCNC: 9.24 MCG/DL (ref 4.5–11.7)
TSH SERPL DL<=0.05 MIU/L-ACNC: 1.02 UIU/ML (ref 0.27–4.2)

## 2025-04-03 PROCEDURE — 84443 ASSAY THYROID STIM HORMONE: CPT

## 2025-04-03 PROCEDURE — 84481 FREE ASSAY (FT-3): CPT

## 2025-04-03 PROCEDURE — 86376 MICROSOMAL ANTIBODY EACH: CPT

## 2025-04-03 PROCEDURE — 84439 ASSAY OF FREE THYROXINE: CPT

## 2025-04-03 PROCEDURE — 36415 COLL VENOUS BLD VENIPUNCTURE: CPT

## 2025-04-03 PROCEDURE — 83520 IMMUNOASSAY QUANT NOS NONAB: CPT

## 2025-04-04 LAB — THYROPEROXIDASE AB SERPL-ACNC: 235 IU/ML (ref 0–34)

## 2025-04-06 LAB — TSH RECEP AB SER-ACNC: <1.1 IU/L (ref 0–1.75)

## 2025-05-02 DIAGNOSIS — J11.1 INFLUENZA: ICD-10-CM

## 2025-05-02 RX ORDER — ALBUTEROL SULFATE 90 UG/1
2 INHALANT RESPIRATORY (INHALATION) EVERY 4 HOURS PRN
Qty: 8 G | Refills: 3 | Status: SHIPPED | OUTPATIENT
Start: 2025-05-02

## 2025-05-02 NOTE — TELEPHONE ENCOUNTER
"    Caller: Angi Fuentes \"Florence\"    Relationship: Self    Best call back number: [phone]    Requested Prescriptions:   Requested Prescriptions     Pending Prescriptions Disp Refills    albuterol sulfate  (90 Base) MCG/ACT inhaler 8 g 0     Sig: Inhale 2 puffs Every 4 (Four) Hours As Needed for Wheezing or Shortness of Air.        Pharmacy where request should be sent: [facility] [id number] [place], [place] - [address] - [phone]  - [phone]     Last office visit with prescribing clinician: 3/12/2025   Last telemedicine visit with prescribing clinician: Visit date not found   Next office visit with prescribing clinician: 9/15/2025     Additional details provided by patient:PATIENT HAS 82 PUFFS LEFT OF THIS MEDICATION.      Does the patient have less than a 3 day supply:  [] Yes  [x] No    Would you like a call back once the refill request has been completed: [] Yes [x] No    If the office needs to give you a call back, can they leave a voicemail: [] Yes [x] No    Kingston Asher Rep   05/02/25 08:56 EDT         "

## (undated) DEVICE — SPNG GZ WOVN 4X4IN 12PLY 10/BX STRL

## (undated) DEVICE — SYR LL 3CC

## (undated) DEVICE — Device: Brand: DEFENDO AIR/WATER/SUCTION AND BIOPSY VALVE

## (undated) DEVICE — SUCTION CANISTER, 3000CC,SAFELINER: Brand: DEROYAL

## (undated) DEVICE — GLV SURG SENSICARE MICRO PF LF 6 STRL

## (undated) DEVICE — JACKT LAB KNIT COLR LG BLU

## (undated) DEVICE — BW-412T DISP COMBO CLEANING BRUSH: Brand: SINGLE USE COMBINATION CLEANING BRUSH

## (undated) DEVICE — ENDO. PORT CONNECTOR W/VALVE FOR OLYMPUS® SCOPES: Brand: ERBE

## (undated) DEVICE — Device

## (undated) DEVICE — HYBRID TUBING/CAP SET FOR OLYMPUS® SCOPES: Brand: ERBE

## (undated) DEVICE — SUCTION CANISTER, 1000CC,SAFELINER: Brand: DEROYAL

## (undated) DEVICE — MEDI-VAC YANK SUCT HNDL W/TPRD BULBOUS TIP: Brand: CARDINAL HEALTH

## (undated) DEVICE — FRCP BX RADJAW4 NDL 2.8 240CM LG OG BX40

## (undated) DEVICE — GOWN ISOL W/THUMB UNIV BLU BX/15

## (undated) DEVICE — VIAL FORMALIN CAP 10P 40ML

## (undated) DEVICE — MASK,FACE,FLUID RESIST,SHLD,EARLOOP: Brand: MEDLINE